# Patient Record
Sex: MALE | Race: WHITE | NOT HISPANIC OR LATINO | Employment: FULL TIME | URBAN - METROPOLITAN AREA
[De-identification: names, ages, dates, MRNs, and addresses within clinical notes are randomized per-mention and may not be internally consistent; named-entity substitution may affect disease eponyms.]

---

## 2019-05-23 ENCOUNTER — HISTORICAL (OUTPATIENT)
Dept: ADMINISTRATIVE | Facility: HOSPITAL | Age: 54
End: 2019-05-23

## 2019-05-23 LAB
ABS NEUT (OLG): 5.2 X10(3)/MCL (ref 2.1–9.2)
ALBUMIN SERPL-MCNC: 4.4 GM/DL (ref 3.4–5)
ALBUMIN/GLOB SERPL: 1.38 {RATIO} (ref 1.5–2.5)
ALP SERPL-CCNC: 71 UNIT/L (ref 38–126)
ALT SERPL-CCNC: 16 UNIT/L (ref 7–52)
APPEARANCE, UA: CLEAR
AST SERPL-CCNC: 17 UNIT/L (ref 15–37)
BACTERIA #/AREA URNS AUTO: ABNORMAL /HPF
BILIRUB SERPL-MCNC: 0.7 MG/DL (ref 0.2–1)
BILIRUB UR QL STRIP: ABNORMAL MG/DL
BILIRUBIN DIRECT+TOT PNL SERPL-MCNC: 0.1 MG/DL (ref 0–0.5)
BILIRUBIN DIRECT+TOT PNL SERPL-MCNC: 0.6 MG/DL
BUN SERPL-MCNC: 22 MG/DL (ref 7–18)
CALCIUM SERPL-MCNC: 9.1 MG/DL (ref 8.5–10)
CHLORIDE SERPL-SCNC: 105 MMOL/L (ref 98–107)
CHOLEST SERPL-MCNC: 216 MG/DL (ref 0–200)
CHOLEST/HDLC SERPL: 5.3 {RATIO}
CO2 SERPL-SCNC: 26 MMOL/L (ref 21–32)
COLOR UR: YELLOW
CREAT SERPL-MCNC: 1.06 MG/DL (ref 0.6–1.3)
ERYTHROCYTE [DISTWIDTH] IN BLOOD BY AUTOMATED COUNT: 13.5 % (ref 11.5–17)
EST. AVERAGE GLUCOSE BLD GHB EST-MCNC: 134 MG/DL
GLOBULIN SER-MCNC: 3.2 GM/DL (ref 1.2–3)
GLUCOSE (UA): NEGATIVE MG/DL
GLUCOSE SERPL-MCNC: 116 MG/DL (ref 74–106)
HBA1C MFR BLD: 6.3 % (ref 4.4–6.4)
HCT VFR BLD AUTO: 46.1 % (ref 42–52)
HDLC SERPL-MCNC: 41 MG/DL (ref 35–60)
HGB BLD-MCNC: 15.8 GM/DL (ref 14–18)
HGB UR QL STRIP: NEGATIVE UNIT/L
KETONES UR QL STRIP: NEGATIVE MG/DL
LDLC SERPL CALC-MCNC: 146 MG/DL (ref 0–129)
LEUKOCYTE ESTERASE UR QL STRIP: NEGATIVE UNIT/L
LYMPHOCYTES # BLD AUTO: 2.9 X10(3)/MCL (ref 0.6–3.4)
LYMPHOCYTES NFR BLD AUTO: 31.8 % (ref 13–40)
MCH RBC QN AUTO: 31.4 PG (ref 27–31.2)
MCHC RBC AUTO-ENTMCNC: 34 GM/DL (ref 32–36)
MCV RBC AUTO: 92 FL (ref 80–94)
MONOCYTES # BLD AUTO: 0.9 X10(3)/MCL (ref 0.1–1.3)
MONOCYTES NFR BLD AUTO: 9.7 % (ref 0.1–24)
NEUTROPHILS NFR BLD AUTO: 58.5 % (ref 47–80)
NITRITE UR QL STRIP.AUTO: NEGATIVE
PH UR STRIP: 5 [PH]
PLATELET # BLD AUTO: 292 X10(3)/MCL (ref 130–400)
PMV BLD AUTO: 9 FL (ref 9.4–12.4)
POTASSIUM SERPL-SCNC: 4.5 MMOL/L (ref 3.5–5.1)
PROT SERPL-MCNC: 7.6 GM/DL (ref 6.4–8.2)
PROT UR QL STRIP: NEGATIVE MG/DL
PSA SERPL-MCNC: 4.62 NG/ML (ref 0–3.5)
RBC # BLD AUTO: 5.03 X10(6)/MCL (ref 4.7–6.1)
RBC #/AREA URNS HPF: ABNORMAL /HPF
SODIUM SERPL-SCNC: 138 MMOL/L (ref 136–145)
SP GR UR STRIP: 1.02
SQUAMOUS EPITHELIAL, UA: ABNORMAL /LPF
TRIGL SERPL-MCNC: 148 MG/DL (ref 30–150)
TSH SERPL-ACNC: 2.09 MIU/ML (ref 0.35–4.94)
UROBILINOGEN UR STRIP-ACNC: 0.2 MG/DL
VLDLC SERPL CALC-MCNC: 29.6 MG/DL
WBC # SPEC AUTO: 9 X10(3)/MCL (ref 4.5–11.5)
WBC #/AREA URNS AUTO: ABNORMAL /[HPF]

## 2019-11-05 ENCOUNTER — HISTORICAL (OUTPATIENT)
Dept: ADMINISTRATIVE | Facility: HOSPITAL | Age: 54
End: 2019-11-05

## 2019-11-05 LAB
ALBUMIN SERPL-MCNC: 4.3 GM/DL (ref 3.4–5)
ALBUMIN/GLOB SERPL: 1.39 {RATIO} (ref 1.5–2.5)
ALP SERPL-CCNC: 69 UNIT/L (ref 38–126)
ALT SERPL-CCNC: 16 UNIT/L (ref 7–52)
AST SERPL-CCNC: 13 UNIT/L (ref 15–37)
BILIRUB SERPL-MCNC: 0.6 MG/DL (ref 0.2–1)
BILIRUBIN DIRECT+TOT PNL SERPL-MCNC: 0.1 MG/DL (ref 0–0.5)
BILIRUBIN DIRECT+TOT PNL SERPL-MCNC: 0.5 MG/DL
BUN SERPL-MCNC: 14 MG/DL (ref 7–18)
CALCIUM SERPL-MCNC: 9.2 MG/DL (ref 8.5–10)
CHLORIDE SERPL-SCNC: 105 MMOL/L (ref 98–107)
CHOLEST SERPL-MCNC: 153 MG/DL (ref 0–200)
CHOLEST/HDLC SERPL: 3.6 {RATIO}
CO2 SERPL-SCNC: 26 MMOL/L (ref 21–32)
CREAT SERPL-MCNC: 1.02 MG/DL (ref 0.6–1.3)
EST. AVERAGE GLUCOSE BLD GHB EST-MCNC: 137 MG/DL
GLOBULIN SER-MCNC: 3.1 GM/DL (ref 1.2–3)
GLUCOSE SERPL-MCNC: 138 MG/DL (ref 74–106)
HBA1C MFR BLD: 6.4 % (ref 4.4–6.4)
HDLC SERPL-MCNC: 42 MG/DL (ref 35–60)
LDLC SERPL CALC-MCNC: 100 MG/DL (ref 0–129)
POTASSIUM SERPL-SCNC: 4.6 MMOL/L (ref 3.5–5.1)
PROT SERPL-MCNC: 7.4 GM/DL (ref 6.4–8.2)
SODIUM SERPL-SCNC: 138 MMOL/L (ref 136–145)
TRIGL SERPL-MCNC: 132 MG/DL (ref 30–150)
VLDLC SERPL CALC-MCNC: 26.4 MG/DL

## 2020-05-26 ENCOUNTER — HISTORICAL (OUTPATIENT)
Dept: ADMINISTRATIVE | Facility: HOSPITAL | Age: 55
End: 2020-05-26

## 2020-05-26 LAB
ABS NEUT (OLG): 4.7 X10(3)/MCL (ref 2.1–9.2)
ALBUMIN SERPL-MCNC: 4.4 GM/DL (ref 3.4–5)
ALBUMIN/GLOB SERPL: 1.69 {RATIO} (ref 1.5–2.5)
ALP SERPL-CCNC: 80 UNIT/L (ref 38–126)
ALT SERPL-CCNC: 18 UNIT/L (ref 7–52)
APPEARANCE, UA: CLEAR
AST SERPL-CCNC: 19 UNIT/L (ref 15–37)
BACTERIA #/AREA URNS AUTO: ABNORMAL /HPF
BILIRUB SERPL-MCNC: 0.6 MG/DL (ref 0.2–1)
BILIRUB UR QL STRIP: NEGATIVE MG/DL
BILIRUBIN DIRECT+TOT PNL SERPL-MCNC: 0.1 MG/DL (ref 0–0.5)
BILIRUBIN DIRECT+TOT PNL SERPL-MCNC: 0.5 MG/DL
BUN SERPL-MCNC: 12 MG/DL (ref 7–18)
CALCIUM SERPL-MCNC: 9 MG/DL (ref 8.5–10)
CHLORIDE SERPL-SCNC: 106 MMOL/L (ref 98–107)
CHOLEST SERPL-MCNC: 137 MG/DL (ref 0–200)
CHOLEST/HDLC SERPL: 4.3 {RATIO}
CO2 SERPL-SCNC: 29 MMOL/L (ref 21–32)
COLOR UR: YELLOW
CREAT SERPL-MCNC: 1 MG/DL (ref 0.6–1.3)
ERYTHROCYTE [DISTWIDTH] IN BLOOD BY AUTOMATED COUNT: 13.1 % (ref 11.5–17)
EST. AVERAGE GLUCOSE BLD GHB EST-MCNC: 148 MG/DL
GLOBULIN SER-MCNC: 2.6 GM/DL (ref 1.2–3)
GLUCOSE (UA): NEGATIVE MG/DL
GLUCOSE SERPL-MCNC: 120 MG/DL (ref 74–106)
HBA1C MFR BLD: 6.8 % (ref 4.4–6.4)
HCT VFR BLD AUTO: 45.1 % (ref 42–52)
HDLC SERPL-MCNC: 32 MG/DL (ref 35–60)
HGB BLD-MCNC: 15.3 GM/DL (ref 14–18)
HGB UR QL STRIP: ABNORMAL UNIT/L
KETONES UR QL STRIP: NEGATIVE MG/DL
LDLC SERPL CALC-MCNC: 80 MG/DL (ref 0–129)
LEUKOCYTE ESTERASE UR QL STRIP: ABNORMAL UNIT/L
LYMPHOCYTES # BLD AUTO: 2.6 X10(3)/MCL (ref 0.6–3.4)
LYMPHOCYTES NFR BLD AUTO: 31.4 % (ref 13–40)
MCH RBC QN AUTO: 30.9 PG (ref 27–31.2)
MCHC RBC AUTO-ENTMCNC: 34 GM/DL (ref 32–36)
MCV RBC AUTO: 91 FL (ref 80–94)
MONOCYTES # BLD AUTO: 0.9 X10(3)/MCL (ref 0.1–1.3)
MONOCYTES NFR BLD AUTO: 11.1 % (ref 0.1–24)
NEUTROPHILS NFR BLD AUTO: 57.5 % (ref 47–80)
NITRITE UR QL STRIP.AUTO: NEGATIVE
PH UR STRIP: 5 [PH]
PLATELET # BLD AUTO: 259 X10(3)/MCL (ref 130–400)
PMV BLD AUTO: 9.2 FL (ref 9.4–12.4)
POTASSIUM SERPL-SCNC: 4.3 MMOL/L (ref 3.5–5.1)
PROT SERPL-MCNC: 7 GM/DL (ref 6.4–8.2)
PROT UR QL STRIP: NEGATIVE MG/DL
PSA SERPL-MCNC: 4.6 NG/ML (ref 0–3.5)
RBC # BLD AUTO: 4.95 X10(6)/MCL (ref 4.7–6.1)
RBC #/AREA URNS HPF: ABNORMAL /HPF
SODIUM SERPL-SCNC: 140 MMOL/L (ref 136–145)
SP GR UR STRIP: >1.03
SQUAMOUS EPITHELIAL, UA: ABNORMAL /LPF
TRIGL SERPL-MCNC: 164 MG/DL (ref 30–150)
TSH SERPL-ACNC: 2.69 MIU/ML (ref 0.35–4.94)
UROBILINOGEN UR STRIP-ACNC: 0.2 MG/DL
VLDLC SERPL CALC-MCNC: 32.8 MG/DL
WBC # SPEC AUTO: 8.2 X10(3)/MCL (ref 4.5–11.5)
WBC #/AREA URNS AUTO: ABNORMAL /[HPF]

## 2020-08-06 ENCOUNTER — HISTORICAL (OUTPATIENT)
Dept: ADMINISTRATIVE | Facility: HOSPITAL | Age: 55
End: 2020-08-06

## 2020-08-06 LAB
ALBUMIN SERPL-MCNC: 4.7 GM/DL (ref 3.4–5)
ALBUMIN/GLOB SERPL: 1.81 {RATIO} (ref 1.5–2.5)
ALP SERPL-CCNC: 75 UNIT/L (ref 38–126)
ALT SERPL-CCNC: 17 UNIT/L (ref 7–52)
AST SERPL-CCNC: 19 UNIT/L (ref 15–37)
BILIRUB SERPL-MCNC: 0.6 MG/DL (ref 0.2–1)
BILIRUBIN DIRECT+TOT PNL SERPL-MCNC: 0.1 MG/DL (ref 0–0.5)
BILIRUBIN DIRECT+TOT PNL SERPL-MCNC: 0.5 MG/DL
BUN SERPL-MCNC: 16 MG/DL (ref 7–18)
CALCIUM SERPL-MCNC: 9.5 MG/DL (ref 8.5–10.1)
CHLORIDE SERPL-SCNC: 104 MMOL/L (ref 98–107)
CO2 SERPL-SCNC: 27 MMOL/L (ref 21–32)
CREAT SERPL-MCNC: 1.05 MG/DL (ref 0.6–1.3)
CREAT UR-MCNC: 200 MG/DL
EST. AVERAGE GLUCOSE BLD GHB EST-MCNC: 134 MG/DL
GLOBULIN SER-MCNC: 2.6 GM/DL (ref 1.2–3)
GLUCOSE SERPL-MCNC: 120 MG/DL (ref 74–106)
HBA1C MFR BLD: 6.3 % (ref 4.4–6.4)
MICROALBUMIN UR-MCNC: 30 MG/L
MICROALBUMIN/CREAT RATIO PNL UR: <30 MG/GM
POTASSIUM SERPL-SCNC: 4.7 MMOL/L (ref 3.5–5.1)
PROT SERPL-MCNC: 7.3 GM/DL (ref 6.4–8.2)
SODIUM SERPL-SCNC: 140 MMOL/L (ref 136–145)

## 2021-02-18 ENCOUNTER — HISTORICAL (OUTPATIENT)
Dept: ADMINISTRATIVE | Facility: HOSPITAL | Age: 56
End: 2021-02-18

## 2021-02-18 LAB
ALBUMIN SERPL-MCNC: 4.6 GM/DL (ref 3.4–5)
ALBUMIN/GLOB SERPL: 1.7 {RATIO} (ref 1.5–2.5)
ALP SERPL-CCNC: 68 UNIT/L (ref 38–126)
ALT SERPL-CCNC: 23 UNIT/L (ref 7–52)
AST SERPL-CCNC: 20 UNIT/L (ref 15–37)
BILIRUB SERPL-MCNC: 0.5 MG/DL (ref 0.2–1)
BILIRUBIN DIRECT+TOT PNL SERPL-MCNC: 0.1 MG/DL (ref 0–0.5)
BILIRUBIN DIRECT+TOT PNL SERPL-MCNC: 0.4 MG/DL
BUN SERPL-MCNC: 16 MG/DL (ref 7–18)
CALCIUM SERPL-MCNC: 9.7 MG/DL (ref 8.5–10.1)
CHLORIDE SERPL-SCNC: 102 MMOL/L (ref 98–107)
CHOLEST SERPL-MCNC: 182 MG/DL (ref 0–200)
CHOLEST/HDLC SERPL: 4.3 {RATIO}
CO2 SERPL-SCNC: 26 MMOL/L (ref 21–32)
CREAT SERPL-MCNC: 1.01 MG/DL (ref 0.6–1.3)
EST. AVERAGE GLUCOSE BLD GHB EST-MCNC: 137 MG/DL
GLOBULIN SER-MCNC: 2.8 GM/DL (ref 1.2–3)
GLUCOSE SERPL-MCNC: 104 MG/DL (ref 74–106)
HBA1C MFR BLD: 6.4 % (ref 4.4–6.4)
HDLC SERPL-MCNC: 42 MG/DL (ref 35–60)
LDLC SERPL CALC-MCNC: 109 MG/DL (ref 0–129)
POTASSIUM SERPL-SCNC: 4.3 MMOL/L (ref 3.5–5.1)
PROT SERPL-MCNC: 7.3 GM/DL (ref 6.4–8.2)
SODIUM SERPL-SCNC: 139 MMOL/L (ref 136–145)
TRIGL SERPL-MCNC: 248 MG/DL (ref 30–150)
VLDLC SERPL CALC-MCNC: 49.6 MG/DL

## 2021-06-01 ENCOUNTER — HISTORICAL (OUTPATIENT)
Dept: ADMINISTRATIVE | Facility: HOSPITAL | Age: 56
End: 2021-06-01

## 2021-06-01 LAB
ABS NEUT (OLG): 5.2 X10(3)/MCL (ref 2.1–9.2)
ALBUMIN SERPL-MCNC: 4.4 GM/DL (ref 3.4–5)
ALBUMIN/GLOB SERPL: 1.76 {RATIO} (ref 1.5–2.5)
ALP SERPL-CCNC: 66 UNIT/L (ref 38–126)
ALT SERPL-CCNC: 17 UNIT/L (ref 7–52)
APPEARANCE, UA: CLEAR
AST SERPL-CCNC: 16 UNIT/L (ref 15–37)
BACTERIA #/AREA URNS AUTO: ABNORMAL /HPF
BILIRUB SERPL-MCNC: 0.5 MG/DL (ref 0.2–1)
BILIRUB UR QL STRIP: NEGATIVE MG/DL
BILIRUBIN DIRECT+TOT PNL SERPL-MCNC: 0.1 MG/DL (ref 0–0.5)
BILIRUBIN DIRECT+TOT PNL SERPL-MCNC: 0.4 MG/DL
BUN SERPL-MCNC: 20 MG/DL (ref 7–18)
CALCIUM SERPL-MCNC: 9.2 MG/DL (ref 8.5–10.1)
CHLORIDE SERPL-SCNC: 106 MMOL/L (ref 98–107)
CHOLEST SERPL-MCNC: 153 MG/DL (ref 0–200)
CHOLEST/HDLC SERPL: 4.2 {RATIO}
CO2 SERPL-SCNC: 25 MMOL/L (ref 21–32)
COLOR UR: ABNORMAL
CREAT SERPL-MCNC: 0.96 MG/DL (ref 0.6–1.3)
ERYTHROCYTE [DISTWIDTH] IN BLOOD BY AUTOMATED COUNT: 13 % (ref 11.5–17)
EST. AVERAGE GLUCOSE BLD GHB EST-MCNC: 143 MG/DL
GLOBULIN SER-MCNC: 2.5 GM/DL (ref 1.2–3)
GLUCOSE (UA): NEGATIVE MG/DL
GLUCOSE SERPL-MCNC: 130 MG/DL (ref 74–106)
HBA1C MFR BLD: 6.6 % (ref 4.4–6.4)
HCT VFR BLD AUTO: 42.9 % (ref 42–52)
HDLC SERPL-MCNC: 36 MG/DL (ref 35–60)
HGB BLD-MCNC: 14.8 GM/DL (ref 14–18)
HGB UR QL STRIP: ABNORMAL UNIT/L
KETONES UR QL STRIP: NEGATIVE MG/DL
LDLC SERPL CALC-MCNC: 85 MG/DL (ref 0–129)
LEUKOCYTE ESTERASE UR QL STRIP: NEGATIVE UNIT/L
LYMPHOCYTES # BLD AUTO: 2.6 X10(3)/MCL (ref 0.6–3.4)
LYMPHOCYTES NFR BLD AUTO: 30.5 % (ref 13–40)
MCH RBC QN AUTO: 31.3 PG (ref 27–31.2)
MCHC RBC AUTO-ENTMCNC: 34 GM/DL (ref 32–36)
MCV RBC AUTO: 91 FL (ref 80–94)
MONOCYTES # BLD AUTO: 0.8 X10(3)/MCL (ref 0.1–1.3)
MONOCYTES NFR BLD AUTO: 9.6 % (ref 0.1–24)
NEUTROPHILS NFR BLD AUTO: 59.9 % (ref 47–80)
NITRITE UR QL STRIP.AUTO: NEGATIVE
PH UR STRIP: 5 [PH]
PLATELET # BLD AUTO: 265 X10(3)/MCL (ref 130–400)
PMV BLD AUTO: 8.6 FL (ref 9.4–12.4)
POTASSIUM SERPL-SCNC: 4 MMOL/L (ref 3.5–5.1)
PROT SERPL-MCNC: 6.9 GM/DL (ref 6.4–8.2)
PROT UR QL STRIP: NEGATIVE MG/DL
PSA SERPL-MCNC: 5.05 NG/ML (ref 0–3.5)
RBC # BLD AUTO: 4.73 X10(6)/MCL (ref 4.7–6.1)
RBC #/AREA URNS HPF: ABNORMAL /HPF
SODIUM SERPL-SCNC: 140 MMOL/L (ref 136–145)
SP GR UR STRIP: >1.03
SQUAMOUS EPITHELIAL, UA: ABNORMAL /LPF
TRIGL SERPL-MCNC: 194 MG/DL (ref 30–150)
TSH SERPL-ACNC: 2.8 MIU/ML (ref 0.35–4.94)
UROBILINOGEN UR STRIP-ACNC: 0.2 MG/DL
VLDLC SERPL CALC-MCNC: 38.8 MG/DL
WBC # SPEC AUTO: 8.6 X10(3)/MCL (ref 4.5–11.5)
WBC #/AREA URNS AUTO: ABNORMAL /[HPF]

## 2021-12-02 ENCOUNTER — HISTORICAL (OUTPATIENT)
Dept: ADMINISTRATIVE | Facility: HOSPITAL | Age: 56
End: 2021-12-02

## 2021-12-02 LAB
ALBUMIN SERPL-MCNC: 4.4 GM/DL (ref 3.4–5)
ALBUMIN/GLOB SERPL: 1.69 {RATIO} (ref 1.5–2.5)
ALP SERPL-CCNC: 71 UNIT/L (ref 38–126)
ALT SERPL-CCNC: 19 UNIT/L (ref 7–52)
AST SERPL-CCNC: 17 UNIT/L (ref 15–37)
BILIRUB SERPL-MCNC: 0.7 MG/DL (ref 0.2–1)
BILIRUBIN DIRECT+TOT PNL SERPL-MCNC: 0.2 MG/DL (ref 0–0.5)
BILIRUBIN DIRECT+TOT PNL SERPL-MCNC: 0.5 MG/DL
BUN SERPL-MCNC: 14 MG/DL (ref 7–18)
CALCIUM SERPL-MCNC: 9.1 MG/DL (ref 8.5–10.1)
CHLORIDE SERPL-SCNC: 104 MMOL/L (ref 98–107)
CHOLEST SERPL-MCNC: 159 MG/DL (ref 0–200)
CHOLEST/HDLC SERPL: 4.5 {RATIO}
CO2 SERPL-SCNC: 26 MMOL/L (ref 21–32)
CREAT SERPL-MCNC: 0.9 MG/DL (ref 0.6–1.3)
EST CREAT CLEARANCE SER (OHS): 187.77 ML/MIN
EST. AVERAGE GLUCOSE BLD GHB EST-MCNC: 140 MG/DL
GLOBULIN SER-MCNC: 2.6 GM/DL (ref 1.2–3)
GLUCOSE SERPL-MCNC: 134 MG/DL (ref 74–106)
HBA1C MFR BLD: 6.5 % (ref 4.4–6.4)
HDLC SERPL-MCNC: 35 MG/DL (ref 35–60)
LDLC SERPL CALC-MCNC: 95 MG/DL (ref 0–129)
POTASSIUM SERPL-SCNC: 4.3 MMOL/L (ref 3.5–5.1)
PROT SERPL-MCNC: 7 GM/DL (ref 6.4–8.2)
SODIUM SERPL-SCNC: 139 MMOL/L (ref 136–145)
TRIGL SERPL-MCNC: 166 MG/DL (ref 30–150)
VLDLC SERPL CALC-MCNC: 33.2 MG/DL

## 2022-04-10 ENCOUNTER — HISTORICAL (OUTPATIENT)
Dept: ADMINISTRATIVE | Facility: HOSPITAL | Age: 57
End: 2022-04-10

## 2022-04-29 VITALS
DIASTOLIC BLOOD PRESSURE: 94 MMHG | HEIGHT: 71 IN | BODY MASS INDEX: 44.1 KG/M2 | WEIGHT: 315 LBS | SYSTOLIC BLOOD PRESSURE: 146 MMHG

## 2022-05-02 NOTE — HISTORICAL [facility]
This is a historical note converted from [employer]. Formatting and pictures may have been removed.  Please reference [employer]ah for original formatting and attached multimedia. Chief Complaint  CPX/FASTING  History of Present Illness  Patient is here today for wellness CPX. ?He was last seen?over 3 years ago.? He did have a prostate biopsy since her last visit that was negative. ?He also had gallbladder surgery?that ended up turning to an open surgery?and has left him with some discomfort in his right upper quadrant along?his large surgical incision.? He has gained about 22 pounds over the past 3 years.  Review of Systems  GENERAL:?no? unexplained wt ?loss or gain, fever, + fatigue, chills, night sweats or ?weakness  HEENT: no?? sore throat, ?ear pain, ?sinus pressure, ?nasal congestion, or ?rhinorrhea, +AR  VISION:?no ?vision changes, ?glaucoma, cataracts, reading glasses  CARDIAC: no? chest pain,??palpitations, +?Dyspnea on exertion (out of shape), ?orthopnea  RESPIRATORY:?no??cough,?wheezing, sputum production or?SOB  GI:????+ RUQ? abdominal pain since GB surgery 4 years ?ago, n&v,?constipation, diarrhea,??blood in stool or_?no family history of colon cancer?_  :?no? dysuria, ?hematuria, ?frequency, urgency, ?incontinence,? testicular pain/swelling,?_?no family history of prostate cancer_? elevated PSa in past neg biopsy  MUSC/SKEL:? no? myalgia, ?weakness, edema,? Fingers/knees?arthralgia, or ?joint effusion, hx gout  SKIN:? No?rash, hives,?itching or?sores  NEURO:? No?headaches, numbness, tingling,? weakness, or ?dizziness  PSYCH:? No anxiety, off and on depression, ?irritability, ?suicidal ideation or hallucinations  ENDO:? No ?polyuria, ?polydipsia, ?polyphagia  HEME:? No Bruising, lymphadenopathy, bleeding disorders ?or?signs of anemia  Physical Exam  Vitals & Measurements  HR:?102(Peripheral)? BP:?148/96?  HT:?181.6?cm? WT:?146.6?kg? BMI:?44.45?  GENERAL: NAD, alert and oriented x 3  SKIN:? no rash or abnormal  appearing skin lesions  HEENT:? PERRLA, EOMI, mouth wnl, throat wnl, EAC and TM wnl bilaterally  NECK:? FROM, no lymphadenopathy, no thyroid abnormalities palpable  CHEST:? CTA bilaterally no wheezes, crackles or rubs  CARDIAC:? RRR, no murmurs audible  ABDOMEN:? Soft, nontender, nondistended, NBSx4,?no rebound or guarding, no HSM, Large RUQ incision, mild herniation.  EXTREMITIES:? no clubbing, cyanosis, or edema.? joints wnl. +2 DP/PT pulse bilaterally  NEURO:? no sensory or motor deficits noted. CN II-XII intact. Gait wnl.?  GENITAL: normal testes, no hernia  RECTAL: no hemorrhoids, or fissures, prostate mildly enlarged, smooth surface  Assessment/Plan  1.?Wellness examination?Z00.00  ?CBC, CMP, FLP,? U/A,TSH, A1C ,? colonoscopy 2013 due 2016--set up Dr Starks,? Encourage pt to increase cardiovascular exercise and attempt to obtain at least 150 minutes of moderate aerobic exercise per week or 75 minutes of vigorous aerobic exercise weekly.  Ordered:  CBC w/ Auto Diff, Routine collect, 05/23/19 13:49:00 CDT, Blood, Order for future visit, Stop date 05/23/19 13:49:00 CDT, Lab Collect, Wellness examination, 05/23/19 13:49:00 CDT  Clinic Follow Up Physical, *Est. 05/23/20 3:00:00 CDT, Order for future visit, Wellness examination, [facility]  Comprehensive Metabolic Panel, Routine collect, 05/23/19 13:49:00 CDT, Blood, Order for future visit, Stop date 05/23/19 13:49:00 CDT, Lab Collect, Wellness examination, 05/23/19 13:49:00 CDT  External Referral, screening colonoscopy, GI, [facility], last in 2013, due in 2016, saw lisa but would like different MD, 05/23/19 13:52:00 CDT, Wellness examination  Hemoglobin A1c, Routine collect, 05/23/19 13:50:00 CDT, Blood, Order for future visit, Stop date 05/23/19 13:50:00 CDT, Lab Collect, Wellness examination, 05/23/19 13:50:00 CDT  Lab Collection Request, 05/23/19 13:49:00 CDT, [facility] - [facility], 05/23/19 13:49:00 CDT  Lipid Panel, Routine collect, 05/23/19 13:50:00 CDT,  Blood, Order for future visit, Stop date 05/23/19 13:50:00 CDT, Lab Collect, Wellness examination, 05/23/19 13:50:00 CDT  Preventative Health Care New 40-64 years [id number] PC, Wellness examination, [facility] - [facility], 05/23/19 13:51:00 CDT  Prostate Specific Antigen, Routine collect, 05/23/19 13:50:00 CDT, Blood, Order for future visit, Stop date 05/23/19 13:50:00 CDT, Lab Collect, Wellness examination, 05/23/19 13:50:00 CDT  Thyroid Stimulating Hormone, Routine collect, 05/23/19 13:50:00 CDT, Blood, Order for future visit, Stop date 05/23/19 13:50:00 CDT, Lab Collect, Wellness examination, 05/23/19 13:50:00 CDT  Urinalysis Complete no reflex, Routine collect, Urine, Order for future visit, 05/23/19 13:50:00 CDT, Stop date 05/23/19 13:50:00 CDT, Nurse collect, Wellness examination  ?  2.?Immunization due?Z23  ?Tdap  Ordered:  tetanus/diphth/pertuss (Tdap) adult/adol, 0.5 mL, IM, Once-Unscheduled, first dose 05/23/19 13:50:00 CDT  ?  3.?Elevated BP without diagnosis of hypertension?R03.0  ?monitor Bp and drop of for review in 2 weeks.  ?  Referrals  External Referral, screening colonoscopy, GI, [facility], last in 2013, due in 2016, saw lisa but would like different MD, 05/23/19 13:52:00 CDT, Wellness examination  Clinic Follow Up Physical, *Est. 05/23/20 3:00:00 CDT, Order for future visit, Wellness examination, [facility]   Problem List/Past Medical History  Ongoing  Elevated BP without diagnosis of hypertension  Morbid obesity  Wellness examination  Historical  Articular gout  Depression  Elevated PSA  H. pylori  kidney stones  Pancreatitis  pnuemonia  Procedure/Surgical History  Cholangiography and/or pancreatography; through existing catheter, radiological supervision and interpretation. (03/10/2014)  Injection procedure for cholangiography through an existing catheter (eg, percutaneous transhepatic or T-tube). (03/10/2014)  Other cholangiogram (03/10/2014)  Cholecystectomy (02/12/2014)  Cholecystectomy  Laparoscopic (None) (02/12/2014)  Cholecystectomy; with cholangiography. (02/12/2014)  Intraoperative cholangiogram (02/12/2014)  Colonoscopy (09/01/2013)  Renal lithotripsy (2010)  Ankle  cholecystec  Cholecystectomy; with cholangiography  prostate Biopsy  tib/fib fracture and surgery   Medications  loratadine 10 mg oral capsule, 10 mg= 1 cap(s), Oral, Daily  Allergies  Seafood  Social History  Alcohol  Past, Beer, Stopped age 29 Years. 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  Congestive heart disease.: Father.  Diabetes mellitus: Father.  Immunizations  Vaccine Date Status Comments   tetanus/diphtheria/pertussis, acel(Tdap) 05/23/2019 Given    influenza virus vaccine, inactivated 11/18/2017 Recorded    influenza virus vaccine, inactivated 11/10/2016 Recorded    influenza virus vaccine, inactivated 11/13/2013 Recorded    influenza virus vaccine, inactivated 10/04/2011 Recorded 2019-05-22: UNKNOWN CAMPAIGNID   tetanus/diphth/pertuss (Tdap) adult/adol 10/21/2010 Recorded    influenza virus vaccine, inactivated 10/25/2007 Recorded    Health Maintenance  Health Maintenance  ???Pending?(in the next year)  ??? ??OverDue  ??? ? ? ?ADL Screening due??02/14/15??and every 1??year(s)  ??? ? ? ?Alcohol Misuse Screening due??01/01/19??and every 1??year(s)  ??? ??Due?  ??? ? ? ?Aspirin Therapy for CVD Prevention due??05/23/19??and every 1??year(s)  ??? ??Due In Future?  ??? ? ? ?Obesity Screening not due until??01/01/20??and every 1??year(s)  ???Satisfied?(in the past 1 year)  ??? ??Satisfied?  ??? ? ? ?Blood  Pressure Screening on??05/23/19.??Satisfied by So Ch CMA  ??? ? ? ?Body Mass Index Check on??05/23/19.??Satisfied by So Ch CMA  ??? ? ? ?Obesity Screening on??05/23/19.??Satisfied by So Ch CMA  ??? ? ? ?Tetanus Vaccine on??05/23/19.??Satisfied by So Ch CMA  ?  ?

## 2022-05-02 NOTE — HISTORICAL [facility]
This is a historical note converted from [employer]. Formatting and pictures may have been removed.  Please reference [employer] for original formatting and attached multimedia. Chief Complaint  6MTH RC/FASTING  History of Present Illness  Patient presents to office for 6-month follow-up diabetes.? He is taking all medications as prescribed. ?He admits to not following a healthy diet. ?He is eating?carbs and fast food.? He denies checking his CBGs at home. ?He is active at work but denies exercising. ?He also denies?recent eye exam. ?He has no complaints at this time. ?He denies chest pain or shortness of breath.  Review of Systems  General: No weight gain  HEENT: No vision changes  Cardiac: No CP, no SOB  GI: No diarrhea, no N/V  Endocrine: No polyuria, no polydipsia, no polyphagia  Extremities: No signs/symptoms of neuropathy  Physical Exam  Vitals & Measurements  T:?36.9? ?C (Oral)? HR:?92(Peripheral)? BP:?130/90?  HT:?181.00?cm? WT:?146.000?kg? BMI:?44.57?  General: Awake, alert, oriented x4  HEENT: PERRLA, mouth and throat clear, moist mucus membranes  Chest: CTA bilaterally  Cardiac: RRR, no murmurs, rubs or gallops  Extremities: No edema, +2 pulses DP/PT  ?  Assessment/Plan  1.?Diabetes mellitus?E11.9  On metformin 500mg BID. Continue current treatment, encouraged?daily CBGs with log, low carb diet/ADA diet recommendations discussed.? Will continue to monitor and FU.?  Eye exam--never, unable to afford.? Encouraged patient to complete when finances become available.  Foot exam 8/2020.  Microalbumin- 8/2020.  CMP/A1C today.  Discussed weight loss with patient.? He needs to increase his physical activity.  Ordered:  Comprehensive Metabolic Panel, Routine collect, 02/18/21 15:14:00 CST, Blood, Stop date 02/18/21 15:14:00 CST, Lab Collect, Diabetes mellitus  Hypercholesterolemia, 02/18/21 15:14:00 CST  Lipid Panel, Routine collect, 02/18/21 15:14:00 CST, Blood, Stop date 02/18/21 15:14:00 CST, Lab Collect,  Hypercholesterolemia  Diabetes mellitus, 02/18/21 15:14:00 CST  Office/Outpatient Visit Level 3 Established [id number] PC, Diabetes mellitus  Hypercholesterolemia, [facility] - [facility], 02/18/21 15:11:00 CST  ?  2.?Hypercholesterolemia?E78.00  Continue current medicine. ?Follow low-cholesterol diet with?physical activity.??  Ordered:  Comprehensive Metabolic Panel, Routine collect, 02/18/21 15:14:00 CST, Blood, Stop date 02/18/21 15:14:00 CST, Lab Collect, Diabetes mellitus  Hypercholesterolemia, 02/18/21 15:14:00 CST  Lipid Panel, Routine collect, 02/18/21 15:14:00 CST, Blood, Stop date 02/18/21 15:14:00 CST, Lab Collect, Hypercholesterolemia  Diabetes mellitus, 02/18/21 15:14:00 CST  Office/Outpatient Visit Level 3 Established [id number] PC, Diabetes mellitus  Hypercholesterolemia, [facility] - [facility], 02/18/21 15:11:00 CST  ?  Orders:  metFORMIN, 500 mg = 1 tab(s), Oral, BID, # 60 tab(s), 6 Refill(s), Pharmacy: [facility] #[id number], 181, cm, Height/Length Dosing, 02/18/21 14:43:00 CST, 146, kg, Weight Dosing, 02/18/21 14:43:00 CST  Clinic Follow up, *Est. 06/01/21 3:00:00 CDT, Order for future visit, Wellness examination, [facility]  /90--given log to record for 2 weeks.? Fax number written on?log.? Decrease salt intake.? Weight loss?discussed.  Referrals  Clinic Follow up, *Est. 06/01/21 3:00:00 CDT, Order for future visit, Wellness examination, [facility]  Clinic Follow up, Order for future visit   Problem List/Past Medical History  Ongoing  Diabetes mellitus  Elevated BP without diagnosis of hypertension  Hypercholesterolemia  Morbid obesity  Wellness examination  Historical  Articular gout  Depression  Elevated PSA  H. pylori  kidney stones  Pancreatitis  pnuemonia  Procedure/Surgical History  Cholangiography and/or pancreatography; through existing catheter, radiological supervision and interpretation. (03/10/2014)  Injection procedure for cholangiography through an existing catheter (eg, percutaneous  transhepatic or T-tube). (03/10/2014)  Other cholangiogram (03/10/2014)  Cholecystectomy (02/12/2014)  Cholecystectomy Laparoscopic (None) (02/12/2014)  Cholecystectomy; with cholangiography. (02/12/2014)  Intraoperative cholangiogram (02/12/2014)  Colonoscopy (09/01/2013)  Renal lithotripsy (2010)  cholecystec  Cholecystectomy; with cholangiography  prostate Biopsy  tib/fib fracture and surgery   Medications  loratadine 10 mg oral capsule, 10 mg= 1 cap(s), Oral, Daily  metformin 500 mg oral tablet, 500 mg= 1 tab(s), Oral, BID, 6 refills  rosuvastatin 10 mg oral tablet, See Instructions  Wellbutrin  mg/24 hours oral tablet, extended release, 150 mg= 1 tab(s), Oral, q24hr, 3 refills  Allergies  Seafood  Social History  Abuse/Neglect  No, 02/18/2021  No, 08/06/2020  No, 05/26/2020  No, 11/05/2019  Alcohol  Past, Beer, Stopped age 29 Years. 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  Congestive heart disease.: Father.  Diabetes mellitus: Father.  Immunizations  Vaccine Date Status Comments   zoster vaccine, inactivated 10/04/2019 Recorded    influenza virus vaccine, inactivated 10/02/2019 Recorded    tetanus/diphtheria/pertussis, acel(Tdap) 05/23/2019 Given    influenza virus vaccine, inactivated 11/18/2017 Recorded    influenza virus vaccine, inactivated 11/10/2016 Recorded    influenza virus  vaccine, inactivated 11/13/2013 Recorded    influenza virus vaccine, inactivated 10/04/2011 Recorded 2019-05-22: UNKNOWN CAMPAIGNID   tetanus/diphth/pertuss (Tdap) adult/adol 10/21/2010 Recorded    influenza virus vaccine, inactivated 10/25/2007 Recorded    Health Maintenance  Health Maintenance  ???Pending?(in the next year)  ??? ??OverDue  ??? ? ? ?ADL Screening due??02/14/15??and every 1??year(s)  ??? ? ? ?Influenza Vaccine due??10/01/20??and every 1??day(s)  ??? ??Due?  ??? ? ? ?Alcohol Misuse Screening due??01/02/21??and every 1??year(s)  ??? ? ? ?Diabetes Maintenance-Eye Exam due??02/18/21??Unknown Frequency  ??? ? ? ?Zoster Vaccine due??02/18/21??Unknown Frequency  ??? ??Due In Future?  ??? ? ? ?Diabetes Maintenance-Fasting Lipid Profile not due until??05/26/21??and every 1??year(s)  ??? ? ? ?Aspirin Therapy for CVD Prevention not due until??05/26/21??and every 1??year(s)  ??? ? ? ?Diabetes Maintenance-Foot Exam not due until??08/06/21??and every 1??year(s)  ??? ? ? ?Diabetes Maintenance-Serum Creatinine not due until??08/06/21??and every 1??year(s)  ??? ? ? ?Obesity Screening not due until??01/01/22??and every 1??year(s)  ???Satisfied?(in the past 1 year)  ??? ??Satisfied?  ??? ? ? ?Aspirin Therapy for CVD Prevention on??05/26/20.??Satisfied by Zachary Gonzales MD  ??? ? ? ?Blood Pressure Screening on??02/18/21.??Satisfied by Lauren Collado NP  ??? ? ? ?Body Mass Index Check on??02/18/21.??Satisfied by So Ch CMA  ??? ? ? ?Diabetes Maintenance-Fasting Lipid Profile on??05/26/20.??Satisfied by Steph Ch  ??? ? ? ?Diabetes Maintenance-Foot Exam on??08/06/20.??Satisfied by Lauren Collado NP  ??? ? ? ?Diabetes Maintenance-HgbA1c on??02/18/21.??Satisfied by Santi Ritter  ??? ? ? ?Diabetes Maintenance-Microalbumin on??08/06/20.??Satisfied by Santi Ritter  ??? ? ? ?Diabetes Maintenance-Serum Creatinine on??08/06/20.??Satisfied by Steph Ch  ??? ? ? ?Diabetes  Screening on??02/18/21.??Satisfied by Santi Ritter  ??? ? ? ?Lipid Screening on??05/26/20.??Satisfied by Steph Ch  ??? ? ? ?Obesity Screening on??02/18/21.??Satisfied by So Ch CMA  ?      agree with fany huddleston and followup.

## 2022-05-02 NOTE — HISTORICAL [facility] CERNER
This is a historical note converted from Jonah. Formatting and pictures may have been removed.  Please reference Jonah for original formatting and attached multimedia. Chief Complaint  6MTH RC/FASTING. EVAL IRRITATED SKIN TAGS ON BACK.  History of Present Illness  Patient is here today for 6-month recheck diabetes.? He reports that he started lisinopril?at last visit?and it was causing a drop in his blood pressure. ?He decreased?to 1/2 tablet?and it continues to happen.? His blood pressure is elevated today.? We will try losartan 25 mg daily?and he will keep a blood pressure record and email it for review in 2 to 3 weeks.? He does have a?cherry?hemangioma on his back?and keeps?getting caught on?straps of his?book sac?and he would like to have it removed.? He did get his Covid vaccination and is planning on getting the booster soon.? He has not done a diabetic eye exam and I offered to set up an appointment for him?however he declined?and will try to set it up on his own.  Review of Systems  General:???no?weightgain?_  HEENT:???novision changes,? dm eye exam?greater than 1 year ago? need to set up--declined for us to set up?  CARDIAC:?no?chest pain, SOB,  GI:?no?diarrhea,?no?n&v  ENDOCRINE:?nopolyuria,no?polydipsia,?no?polyphagia  EXT:?no?signs of neuropathy  Physical Exam  Vitals & Measurements  HR:?92(Peripheral)? BP:?146/94?  HT:?181.00?cm? WT:?146.600?kg? BMI:?44.75?  GENERAL:?? alert and oriented x4  HEENT:? PERRLA, mouth and throat clear, mucous membranes moist  Skin:?Elevated?purple?3/4?centimeter?cherry hemangioma center of back  CHEST:? CTA bilaterally  CARDIAC:? RRR no murmurs audible  EXT:?no? ?edema to lower extremities? ?bilaterally,?diabetic foot exam today, see form  Assessment/Plan  1.?Diabetes mellitus?E11.9  ?Last A1C 6.6, continue Metformin 500 bid, started losartan today, DM eye exam --set up. DM foot exam today  Ordered:  Comprehensive Metabolic Panel, Routine collect, 12/02/21 9:44:00 CST,  Blood, Order for future visit, Stop date 12/02/21 9:44:00 CST, Lab Collect, Diabetes mellitus  HTN (hypertension)  Hypercholesterolemia, 12/02/21 9:44:00 CST  Hemoglobin A1c, Routine collect, 12/02/21 9:44:00 CST, Blood, Order for future visit, Stop date 12/02/21 9:44:00 CST, Lab Collect, Diabetes mellitus, 12/02/21 9:44:00 CST  Lab Collection Request, 12/02/21 9:44:00 CST, [facility] AMB - AFP, 12/02/21 9:44:00 CST, Diabetes mellitus  HTN (hypertension)  Hypercholesterolemia  Office/Outpatient Visit Level 3 Established 99291 PC, Diabetes mellitus  HTN (hypertension)  Hypercholesterolemia, [facility] AMB - AFP, 12/02/21 9:45:00 CST  ?  2.?HTN (hypertension)?I10  (lisinopril 5 mg caused low BP when outdoors working) begin losartan 25 --fax BP rcord in 2-3 weeks  Ordered:  Comprehensive Metabolic Panel, Routine collect, 12/02/21 9:44:00 CST, Blood, Order for future visit, Stop date 12/02/21 9:44:00 CST, Lab Collect, Diabetes mellitus  HTN (hypertension)  Hypercholesterolemia, 12/02/21 9:44:00 CST  Lab Collection Request, 12/02/21 9:44:00 CST, [facility] AMB - AFP, 12/02/21 9:44:00 CST, Diabetes mellitus  HTN (hypertension)  Hypercholesterolemia  Office/Outpatient Visit Level 3 Established 38860 PC, Diabetes mellitus  HTN (hypertension)  Hypercholesterolemia, [facility] AMB - AFP, 12/02/21 9:45:00 CST  ?  3.?Hypercholesterolemia?E78.00  ?continue crestor 10 mg  Ordered:  Comprehensive Metabolic Panel, Routine collect, 12/02/21 9:44:00 CST, Blood, Order for future visit, Stop date 12/02/21 9:44:00 CST, Lab Collect, Diabetes mellitus  HTN (hypertension)  Hypercholesterolemia, 12/02/21 9:44:00 CST  Lab Collection Request, 12/02/21 9:44:00 CST, [facility]LINK AMB - AFP, 12/02/21 9:44:00 CST, Diabetes mellitus  HTN (hypertension)  Hypercholesterolemia  Lipid Panel, Routine collect, 12/02/21 9:44:00 CST, Blood, Order for future visit, Stop date 12/02/21 9:44:00 CST, Lab Collect, Hypercholesterolemia, 12/02/21 9:44:00  CST  Office/Outpatient Visit Level 3 Established 83069 PC, Diabetes mellitus  HTN (hypertension)  Hypercholesterolemia, [facility] AMB - AFP, 12/02/21 9:45:00 CST  ?  4.?Encounter for immunization?Z23  ?flu blok  ?  Orders:  losartan, 25 mg = 1 tab(s), Oral, Daily, # 30 tab(s), 6 Refill(s), Pharmacy: [id number], 181, cm, Height/Length Dosing, 12/02/21 9:31:00 CST, 146.6, kg, Weight Dosing, 12/02/21 9:31:00 CST  Clinic Follow up, *Est. 06/02/22 3:00:00 CDT, Order for future visit, Wellness examination, HLink AFP  CMP, FLP, A1C---? colon scheduled for 1/18/2022  Referrals  Clinic Follow up, *Est. 06/02/22 3:00:00 CDT, Order for future visit, Wellness examination, HLink AFP   Problem List/Past Medical History  Ongoing  Diabetes mellitus  Elevated BP without diagnosis of hypertension  Hypercholesterolemia  Morbid obesity  Wellness examination  Historical  Articular gout  Depression  Elevated PSA  H. pylori  kidney stones  Pancreatitis  pnuemonia  Procedure/Surgical History  Cholangiography and/or pancreatography; through existing catheter, radiological supervision and interpretation. (03/10/2014)  Injection procedure for cholangiography through an existing catheter (eg, percutaneous transhepatic or T-tube). (03/10/2014)  Other cholangiogram (03/10/2014)  Cholecystectomy (02/12/2014)  Cholecystectomy Laparoscopic (None) (02/12/2014)  Cholecystectomy; with cholangiography. (02/12/2014)  Intraoperative cholangiogram (02/12/2014)  Colonoscopy (09/01/2013)  Renal lithotripsy (2010)  cholecystec  Cholecystectomy; with cholangiography  prostate Biopsy  tib/fib fracture and surgery   Medications  losartan 25 mg oral tablet, 25 mg= 1 tab(s), Oral, Daily, 6 refills  metformin 500 mg oral tablet, See Instructions  rosuvastatin 10 mg oral tablet, See Instructions, 9 refills  Allergies  Seafood  Social History  Abuse/Neglect  No, 12/02/2021  No, 06/01/2021  No, 02/18/2021  No, 08/06/2020  No, 05/26/2020  No,  11/05/2019  Alcohol  Past, Beer, Stopped age 29 Years. 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  Home/Environment  Lives with Alone., 05/22/2019  Lives with ., 05/22/2019  Substance Use - Denies Substance Abuse, 02/12/2014  Tobacco - Denies Tobacco Use, 02/12/2014  Never (less than 100 in lifetime), No, 12/02/2021  Never (less than 100 in lifetime), No, 06/01/2021  Never (less than 100 in lifetime), No, 06/01/2021  Never (less than 100 in lifetime), N/A, 02/18/2021  Never (less than 100 in lifetime), N/A, 08/06/2020  Never (less than 100 in lifetime), No, 05/26/2020  Never (less than 100 in lifetime), N/A, 11/05/2019  Never (less than 100 in lifetime), N/A, 05/23/2019  Never (less than 100 in lifetime), N/A, 05/22/2019  Family History  Acute myocardial infarction.: Father.  Congestive heart disease.: Father.  Diabetes mellitus: Father.  Immunizations  Vaccine Date Status Comments   influenza virus vaccine, inactivated 12/02/2021 Given    pneumococcal 13-valent conjugate vaccine 06/01/2021 Given    COVID-19 mRNA, LNP-S, PF - Moderna 04/16/2021 Recorded    COVID-19 mRNA, LNP-S, PF - Moderna 03/17/2021 Recorded    influenza virus vaccine, inactivated 09/30/2020 Recorded    zoster vaccine, inactivated 12/13/2019 Recorded    zoster vaccine, inactivated 10/04/2019 Recorded    zoster vaccine, inactivated 10/02/2019 Recorded    influenza virus vaccine, inactivated 10/02/2019 Recorded    tetanus/diphtheria/pertussis, acel(Tdap) 05/23/2019 Given    influenza virus vaccine, inactivated 11/18/2017 Recorded    influenza virus vaccine, inactivated 11/10/2016 Recorded    influenza virus vaccine, inactivated 11/13/2013 Recorded    influenza virus vaccine, inactivated 10/04/2011 Recorded 2019-05-22: UNKNOWN CAMPAIGNID   tetanus/diphth/pertuss (Tdap) adult/adol 10/21/2010 Recorded    influenza virus vaccine, inactivated 10/25/2007 Recorded     Health Maintenance  Health Maintenance  ???Pending?(in the next year)  ??? ??OverDue  ??? ? ? ?ADL Screening due??02/14/15??and every 1??year(s)  ??? ? ? ?Alcohol Misuse Screening due??01/02/21??and every 1??year(s)  ??? ? ? ?Aspirin Therapy for CVD Prevention due??05/26/21??and every 1??year(s)  ??? ? ? ?Diabetes Maintenance-Foot Exam due??08/06/21??and every 1??year(s)  ??? ??Due?  ??? ? ? ?Depression Screening due??12/02/21??Unknown Frequency  ??? ? ? ?Diabetes Maintenance-Eye Exam due??12/02/21??Unknown Frequency  ??? ??Due In Future?  ??? ? ? ?Obesity Screening not due until??01/01/22??and every 1??year(s)  ??? ? ? ?Hypertension Management-BMP not due until??02/18/22??and every 1??year(s)  ??? ? ? ?Diabetes Maintenance-HgbA1c not due until??06/01/22??and every 1??year(s)  ??? ? ? ?Diabetes Maintenance-Fasting Lipid Profile not due until??06/01/22??and every 1??year(s)  ??? ? ? ?Diabetes Maintenance-Serum Creatinine not due until??06/01/22??and every 1??year(s)  ???Satisfied?(in the past 1 year)  ??? ??Satisfied?  ??? ? ? ?Blood Pressure Screening on??12/02/21.??Satisfied by So Ch CMA  ??? ? ? ?Body Mass Index Check on??12/02/21.??Satisfied by So Ch CMA  ??? ? ? ?Diabetes Maintenance-Fasting Lipid Profile on??06/01/21.??Satisfied by Santi Ritter  ??? ? ? ?Diabetes Maintenance-HgbA1c on??06/01/21.??Satisfied by Steph Ch  ??? ? ? ?Diabetes Maintenance-Serum Creatinine on??06/01/21.??Satisfied by Santi Ritter  ??? ? ? ?Diabetes Screening on??06/01/21.??Satisfied by Steph Ch  ??? ? ? ?Hypertension Management-Blood Pressure on??12/02/21.??Satisfied by So Ch CMA  ??? ? ? ?Hypertension Management-BMP on??06/01/21.??Satisfied by Santi Ritter  ??? ? ? ?Influenza Vaccine on??12/02/21.??Satisfied by Zachary Gonzales MD  ??? ? ? ?Lipid Screening on??06/01/21.??Satisfied by Santi Ritter  ??? ? ? ?Obesity Screening on??12/02/21.??Satisfied by Jing STANTON,  So STEPHENSON  ?

## 2022-05-02 NOTE — HISTORICAL [facility] CERNER
This is a historical note converted from Jonah. Formatting and pictures may have been removed.  Please reference Jonah for original formatting and attached multimedia. Chief Complaint  6MTH RC/FASTING  History of Present Illness  Pt is here today for 6 month f/u hyperglycemia and high chol.? At last visit, he had not been seen in 3 years and labs revealed A1C of 6.3.? His chol was elevated and as a result, he was started on crestor 10 mg.? He reports that he is only taking the Crestor?about 50% of the time because he forgets.? He has not lost any weight?since her last visit.  Review of Systems  General:???no?weightgain?_  HEENT:???novision changes,?  CARDIAC:?no?chest pain, SOB,  GI:?no?diarrhea,?no?n&v  ENDOCRINE:?nopolyuria,no?polydipsia,?no?polyphagia  EXT:?no?signs of neuropathy  Physical Exam  Vitals & Measurements  HR:?88(Peripheral)? BP:?130/84?  HT:?181?cm? WT:?146.2?kg? BMI:?44.63?  GENERAL:?? alert and oriented x4  HEENT:? PERRLA, mouth and throat clear, mucous membranes moist  CHEST:? CTA bilaterally  CARDIAC:? RRR no murmurs audible  EXT:?no? ?edema to lower extremities? ?bilaterally,?  Assessment/Plan  1.?Hyperglycemia?R73.9  Last A1C 6.3, ?low carb diet, check A1C today.  Ordered:  Clinic Follow up, *Est. 05/05/20 3:00:00 CDT, Order for future visit, Wellness examination  Hyperglycemia  Hypercholesterolemia, [facility]Link AFP  Comprehensive Metabolic Panel, Routine collect, 11/05/19 10:47:00 CST, Blood, Order for future visit, Stop date 11/05/19 10:47:00 CST, Lab Collect, Hyperglycemia  Hypercholesterolemia, 11/05/19 10:47:00 CST  Hemoglobin A1c, Routine collect, 11/05/19 10:47:00 CST, Blood, Order for future visit, Stop date 11/05/19 10:47:00 CST, Lab Collect, Hyperglycemia  Hypercholesterolemia, 11/05/19 10:47:00 CST  Lipid Panel, Routine collect, 11/05/19 10:47:00 CST, Blood, Order for future visit, Stop date 11/05/19 10:47:00 CST, Lab Collect, Hyperglycemia  Hypercholesterolemia, 11/05/19 10:47:00  CST  Office/Outpatient Visit Level 3 Established 91917 PC, Hyperglycemia  Hypercholesterolemia, [facility]LINK AMB - AFP, 11/05/19 10:48:00 CST  ?  2.?Hypercholesterolemia?E78.00  ?continue crestor 10 mg--takes 50% time  Ordered:  Clinic Follow up, *Est. 05/05/20 3:00:00 CDT, Order for future visit, Wellness examination  Hyperglycemia  Hypercholesterolemia, HLink AFP  Comprehensive Metabolic Panel, Routine collect, 11/05/19 10:47:00 CST, Blood, Order for future visit, Stop date 11/05/19 10:47:00 CST, Lab Collect, Hyperglycemia  Hypercholesterolemia, 11/05/19 10:47:00 CST  Hemoglobin A1c, Routine collect, 11/05/19 10:47:00 CST, Blood, Order for future visit, Stop date 11/05/19 10:47:00 CST, Lab Collect, Hyperglycemia  Hypercholesterolemia, 11/05/19 10:47:00 CST  Lipid Panel, Routine collect, 11/05/19 10:47:00 CST, Blood, Order for future visit, Stop date 11/05/19 10:47:00 CST, Lab Collect, Hyperglycemia  Hypercholesterolemia, 11/05/19 10:47:00 CST  Office/Outpatient Visit Level 3 Established 11586 PC, Hyperglycemia  Hypercholesterolemia, [facility]LINK AMB - AFP, 11/05/19 10:48:00 CST  ?  3.?Immunization due?Z23  ?flu shot  ?  Orders:  Lab Collection Request, 11/05/19 10:47:00 CST, HLINK AMB - AFP, 11/05/19 10:47:00 CST  CMP, FLP, A1C today--[facility]ysabel lopez unable to reach him to set up colonoscopy that was due in 2016--[facility] gstr # given, pt will contact.  Referrals  Clinic Follow up, *Est. 05/05/20 3:00:00 CDT, Order for future visit, Wellness examination  Hyperglycemia  Hypercholesterolemia, HLink AFP   Problem List/Past Medical History  Ongoing  Elevated BP without diagnosis of hypertension  Morbid obesity  Wellness examination  Historical  Articular gout  Depression  Elevated PSA  H. pylori  kidney stones  Pancreatitis  pnuemonia  Procedure/Surgical History  Cholangiography and/or pancreatography; through existing catheter, radiological supervision and interpretation. (03/10/2014)  Injection procedure for  cholangiography through an existing catheter (eg, percutaneous transhepatic or T-tube). (03/10/2014)  Other cholangiogram (03/10/2014)  Cholecystectomy (02/12/2014)  Cholecystectomy Laparoscopic (None) (02/12/2014)  Cholecystectomy; with cholangiography. (02/12/2014)  Intraoperative cholangiogram (02/12/2014)  Colonoscopy (09/01/2013)  Renal lithotripsy (2010)  Ankle  cholecystec  Cholecystectomy; with cholangiography  prostate Biopsy  tib/fib fracture and surgery   Medications  Crestor 10 mg oral tablet, 10 mg= 1 tab(s), Oral, Daily, 6 refills  loratadine 10 mg oral capsule, 10 mg= 1 cap(s), Oral, Daily  Allergies  Seafood  Social History  Abuse/Neglect  No, 11/05/2019  Alcohol  Past, Beer, Stopped age 29 Years. 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  Congestive heart disease.: Father.  Diabetes mellitus: Father.  Immunizations  Vaccine Date Status Comments   zoster vaccine, inactivated 10/04/2019 Recorded    influenza virus vaccine, inactivated 10/02/2019 Recorded    tetanus/diphtheria/pertussis, acel(Tdap) 05/23/2019 Given    influenza virus vaccine, inactivated 11/18/2017 Recorded    influenza virus vaccine, inactivated 11/10/2016 Recorded    influenza virus vaccine, inactivated 11/13/2013 Recorded    influenza virus vaccine, inactivated 10/04/2011 Recorded 2019-05-22: UNKNOWN CAMPAIGNID   tetanus/diphth/pertuss (Tdap) adult/adol 10/21/2010 Recorded    influenza virus vaccine, inactivated 10/25/2007 Recorded    Health Maintenance  Health  Maintenance  ???Pending?(in the next year)  ??? ??OverDue  ??? ? ? ?Diabetes Screening due??and every?  ??? ? ? ?Influenza Vaccine due??and every?  ??? ? ? ?ADL Screening due??02/14/15??and every 1??year(s)  ??? ? ? ?Alcohol Misuse Screening due??01/01/19??and every 1??year(s)  ??? ??Due?  ??? ? ? ?Aspirin Therapy for CVD Prevention due??11/05/19??and every 1??year(s)  ??? ? ? ?Depression Screening due??11/05/19??and every?  ??? ??Due In Future?  ??? ? ? ?Obesity Screening not due until??01/01/20??and every 1??year(s)  ??? ? ? ?Blood Pressure Screening not due until??11/04/20??and every 1??year(s)  ??? ? ? ?Body Mass Index Check not due until??11/04/20??and every 1??year(s)  ???Satisfied?(in the past 1 year)  ??? ??Satisfied?  ??? ? ? ?Blood Pressure Screening on??11/05/19.??Satisfied by Jing STANTON So L.  ??? ? ? ?Body Mass Index Check on??11/05/19.??Satisfied by Jing STANTON, So L.  ??? ? ? ?Diabetes Screening on??05/23/19.??Satisfied by Santi Ritter  ??? ? ? ?Influenza Vaccine on??10/02/19.??Satisfied by Jing STANTON, So L.  ??? ? ? ?Lipid Screening on??05/23/19.??Satisfied by Santi Ritter  ??? ? ? ?Obesity Screening on??11/05/19.??Satisfied by Jing STANTON, So L.  ??? ? ? ?Tetanus Vaccine on??05/23/19.??Satisfied by Jing STANTON So L.  ?

## 2022-05-02 NOTE — HISTORICAL [facility]
This is a historical note converted from Jonah. Formatting and pictures may have been removed.  Please reference Jonah for original formatting and attached multimedia. Chief Complaint  CPX/FASTING  History of Present Illness  Patient is here today for wellness CPX?and 6-month recheck hyperglycemia.? His last A1c was 6.4.? He has not lost any weight since his last visit.? He is tolerating Crestor 10 mg without side effects.? He does report that the Wellbutrin seems to be helping his depression and would like to continue it.? He is getting ?in 2 weeks.? He does not want referral?for his screening colonoscopy that was due in 2016?due to financial issues. ?He is aware of the risk of waiting.  Review of Systems  GENERAL:?no? unexplained wt ?gain?2lbs , no fever, + fatigue, chills, night sweats or ?weakness  HEENT: no?? sore throat, ?ear pain, ?sinus pressure, ?nasal congestion, or ?rhinorrhea + snoring, has CPAP doesnt wear much  VISION:?no ?vision changes, ?glaucoma, cataracts, reading glasses  CARDIAC: no? chest pain,??palpitations,?Dyspnea on exertion, ?orthopnea  RESPIRATORY:?no??cough,?wheezing, sputum production or?SOB  GI:???+ ?abdominal pain x 5 years since GB surgery ,no ?n&v,?constipation, diarrhea,??blood in stool or_no ?family history of colon cancer?_  :?no? dysuria, ?hematuria, +?frequency x years , no urgency, ?incontinence,? testicular pain/swelling,?no ?family history of prostate cancer_  [facility]:? no? myalgia, ?weakness, edema,?+ fingers and knees ?arthralgia, or ?joint effusion  SKIN:? No?rash, hives,?itching or?sores Left forearm birth candace  NEURO:? No?headaches, numbness, tingling,? weakness, or ?dizziness  PSYCH:? No anxiety, ok depression, ?irritability, ?suicidal ideation or hallucinations  ENDO:? No ?polyuria, ?polydipsia, ?polyphagia  HEME:? No Bruising, lymphadenopathy, bleeding disorders ?or?signs of anemia  Physical Exam  Vitals & Measurements  T:?36.5? ?C (Oral)?  HR:?80(Peripheral)? BP:?138/90?  HT:?181?cm? WT:?147.6?kg? BMI:?45.05?  GENERAL: NAD, alert and oriented x 3  SKIN:? no rash or abnormal appearing skin lesions  HEENT:? PERRLA, EOMI, mouth wnl, throat wnl, EAC and TM wnl bilaterally  NECK:? FROM, no lymphadenopathy, no thyroid abnormalities palpable  CHEST:? CTA bilaterally no wheezes, crackles or rubs  CARDIAC:? RRR, no murmurs audible  ABDOMEN:? Soft, nontender, nondistended, NBSx4,?no rebound or guarding, no HSM  EXTREMITIES:? no clubbing, cyanosis, or edema.? joints wnl. +2 DP/PT pulse bilaterally  NEURO:? no sensory or motor deficits noted. CN II-XII intact. Gait wnl.?  GENITAL: normal testes, no hernia  RECTAL: no hemorrhoids or fissures, prostate WNL  Assessment/Plan  1.?Wellness examination?Z00.00  ?CBC, CMP, FLP, PSA, U/A, A1C, colonoscopy 2013 due 2016--unable to contact last year-decline referral this year, aware of risk---?Encourage pt to increase cardiovascular exercise and attempt to obtain at least 150 minutes of moderate aerobic exercise per week or 75 minutes of vigorous aerobic exercise weekly.  Ordered:  CBC w/ Auto Diff, Routine collect, 05/26/20 10:36:00 CDT, Blood, Order for future visit, Stop date 05/26/20 10:36:00 CDT, Lab Collect, Wellness examination, 05/26/20 10:36:00 CDT  Comprehensive Metabolic Panel, Routine collect, 05/26/20 10:36:00 CDT, Blood, Order for future visit, Stop date 05/26/20 10:36:00 CDT, Lab Collect, Wellness examination  Hypercholesterolemia  Hyperglycemia, 05/26/20 10:36:00 CDT  Hemoglobin A1c, Routine collect, 05/26/20 10:36:00 CDT, Blood, Order for future visit, Stop date 05/26/20 10:36:00 CDT, Lab Collect, Wellness examination  Hyperglycemia, 05/26/20 10:36:00 CDT  Lab Collection Request, 05/26/20 10:36:00 CDT, [facility] - [facility], 05/26/20 10:36:00 CDT, Wellness examination  Lipid Panel, Routine collect, 05/26/20 10:36:00 CDT, Blood, Order for future visit, Stop date 05/26/20 10:36:00 CDT, Lab Collect, Wellness  examination  Hypercholesterolemia, 05/26/20 10:36:00 CDT  Preventative Health Care Est 40-64 years [id number] PC, Wellness examination  Hypercholesterolemia  Hyperglycemia  Depression, [facility] - [facility], 05/26/20 10:36:00 CDT  Thyroid Stimulating Hormone, Routine collect, 05/26/20 10:36:00 CDT, Blood, Order for future visit, Stop date 05/26/20 10:36:00 CDT, Lab Collect, Wellness examination  Depression, 05/26/20 10:36:00 CDT  Urinalysis no Reflex, Routine collect, Urine, Order for future visit, 05/26/20 10:36:00 CDT, Stop date 05/26/20 10:36:00 CDT, Nurse collect, Wellness examination  Hypercholesterolemia  ?  2.?Prostate cancer screening?Z12.5  PSA  Ordered:  Prostate Specific Antigen, Routine collect, 05/26/20 10:36:00 CDT, Blood, Order for future visit, Stop date 05/26/20 10:36:00 CDT, Lab Collect, Prostate cancer screening, 05/26/20 10:36:00 CDT  ?  3.?Encounter for vaccination?Z23  ?got both ?Shingrix  ?  4.?Hypercholesterolemia?E78.00  ?continue crestor 10 mg?dispense 90 with 3 refills  Ordered:  Clinic Follow up, *Est. 11/26/20 3:00:00 CST, Order for future visit, Hyperglycemia  Hypercholesterolemia, [facility]  Comprehensive Metabolic Panel, Routine collect, 05/26/20 10:36:00 CDT, Blood, Order for future visit, Stop date 05/26/20 10:36:00 CDT, Lab Collect, Wellness examination  Hypercholesterolemia  Hyperglycemia, 05/26/20 10:36:00 CDT  Lipid Panel, Routine collect, 05/26/20 10:36:00 CDT, Blood, Order for future visit, Stop date 05/26/20 10:36:00 CDT, Lab Collect, Wellness examination  Hypercholesterolemia, 05/26/20 10:36:00 CDT  Preventative Health Care Est 40-64 years [id number] PC, Wellness examination  Hypercholesterolemia  Hyperglycemia  Depression, [facility] - [facility], 05/26/20 10:36:00 CDT  Urinalysis no Reflex, Routine collect, Urine, Order for future visit, 05/26/20 10:36:00 CDT, Stop date 05/26/20 10:36:00 CDT, Nurse collect, Wellness examination   Hypercholesterolemia  ?  5.?Hyperglycemia?R73.9  ?check A1C, last A1C 6.4--recheck 6 months  Ordered:  Clinic Follow up, *Est. 11/26/20 3:00:00 CST, Order for future visit, Hyperglycemia  Hypercholesterolemia, [facility]  Comprehensive Metabolic Panel, Routine collect, 05/26/20 10:36:00 CDT, Blood, Order for future visit, Stop date 05/26/20 10:36:00 CDT, Lab Collect, Wellness examination  Hypercholesterolemia  Hyperglycemia, 05/26/20 10:36:00 CDT  Hemoglobin A1c, Routine collect, 05/26/20 10:36:00 CDT, Blood, Order for future visit, Stop date 05/26/20 10:36:00 CDT, Lab Collect, Wellness examination  Hyperglycemia, 05/26/20 10:36:00 CDT  Preventative Health Care Est 40-64 years [id number] PC, Wellness examination  Hypercholesterolemia  Hyperglycemia  Depression, [facility] AM[facility] - [facility], 05/26/20 10:36:00 CDT  ?  6.?Depression?F32.9  ?continue wellbutrin  dispense 90 with 3 refills  Ordered:  Preventative Health Care Est 40-64 years [id number] PC, Wellness examination  Hypercholesterolemia  Hyperglycemia  Depression, [facility] - [facility], 05/26/20 10:36:00 CDT  Thyroid Stimulating Hormone, Routine collect, 05/26/20 10:36:00 CDT, Blood, Order for future visit, Stop date 05/26/20 10:36:00 CDT, Lab Collect, Wellness examination  Depression, 05/26/20 10:36:00 CDT  ?  Orders:  buPROPion, 150 mg = 1 tab(s), Oral, q24hr, # 90 tab(s), 3 Refill(s), Pharmacy: [facility] #[id number], 181, cm, Height/Length Dosing, 05/26/20 10:11:00 CDT, 147.6, kg, Weight Dosing, 05/26/20 10:11:00 CDT  rosuvastatin, 10 mg = 1 tab(s), Oral, Daily, # 90 tab(s), 3 Refill(s), Pharmacy: [facility] #[id number], 181, cm, Height/Length Dosing, 05/26/20 10:11:00 CDT, 147.6, kg, Weight Dosing, 05/26/20 10:11:00 CDT  Referrals  Clinic Follow up, *Est. 11/26/20 3:00:00 CST, Order for future visit, Hyperglycemia  Hypercholesterolemia, [facility]   Problem List/Past Medical History  Ongoing  Elevated BP without diagnosis of hypertension  Morbid  obesity  Wellness examination  Historical  Articular gout  Depression  Elevated PSA  H. pylori  kidney stones  Pancreatitis  pnuemonia  Procedure/Surgical History  Cholangiography and/or pancreatography; through existing catheter, radiological supervision and interpretation. (03/10/2014)  Injection procedure for cholangiography through an existing catheter (eg, percutaneous transhepatic or T-tube). (03/10/2014)  Other cholangiogram (03/10/2014)  Cholecystectomy (02/12/2014)  Cholecystectomy Laparoscopic (None) (02/12/2014)  Cholecystectomy; with cholangiography. (02/12/2014)  Intraoperative cholangiogram (02/12/2014)  Colonoscopy (09/01/2013)  Renal lithotripsy (2010)  cholecystec  Cholecystectomy; with cholangiography  prostate Biopsy  tib/fib fracture and surgery   Medications  Crestor 10 mg oral tablet, 10 mg= 1 tab(s), Oral, Daily, 3 refills  loratadine 10 mg oral capsule, 10 mg= 1 cap(s), Oral, Daily  Wellbutrin  mg/24 hours oral tablet, extended release, 150 mg= 1 tab(s), Oral, q24hr, 3 refills  Allergies  Seafood  Social History  Abuse/Neglect  No, 05/26/2020  No, 11/05/2019  Alcohol  Past, Beer, Stopped age 29 Years. 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  Congestive heart disease.: Father.  Diabetes mellitus: Father.  Immunizations  Vaccine Date Status Comments   zoster vaccine, inactivated 10/04/2019 Recorded    influenza virus vaccine, inactivated 10/02/2019 Recorded     tetanus/diphtheria/pertussis, acel(Tdap) 05/23/2019 Given    influenza virus vaccine, inactivated 11/18/2017 Recorded    influenza virus vaccine, inactivated 11/10/2016 Recorded    influenza virus vaccine, inactivated 11/13/2013 Recorded    influenza virus vaccine, inactivated 10/04/2011 Recorded 2019-05-22: UNKNOWN CAMPAIGNID   tetanus/diphth/pertuss (Tdap) adult/adol 10/21/2010 Recorded    influenza virus vaccine, inactivated 10/25/2007 Recorded    Health Maintenance  Health Maintenance  ???Pending?(in the next year)  ??? ??OverDue  ??? ? ? ?Diabetes Screening due??and every?  ??? ? ? ?ADL Screening due??02/14/15??and every 1??year(s)  ??? ? ? ?Alcohol Misuse Screening due??01/01/20??and every 1??year(s)  ??? ??Due?  ??? ? ? ?Depression Screening due??05/26/20??and every?  ??? ??Due In Future?  ??? ? ? ?Obesity Screening not due until??01/01/21??and every 1??year(s)  ???Satisfied?(in the past 1 year)  ??? ??Satisfied?  ??? ? ? ?Aspirin Therapy for CVD Prevention on??05/26/20.??Satisfied by Santos Gonzales MD  ??? ? ? ?Blood Pressure Screening on??05/26/20.??Satisfied by So Ch CMA  ??? ? ? ?Body Mass Index Check on??05/26/20.??Satisfied by So Ch CMA  ??? ? ? ?Diabetes Screening on??11/05/19.??Satisfied by Santi Ritter  ??? ? ? ?Influenza Vaccine on??10/02/19.??Satisfied by So Ch CMA  ??? ? ? ?Lipid Screening on??11/05/19.??Satisfied by Santi Ritter  ??? ? ? ?Obesity Screening on??05/26/20.??Satisfied by So Ch CMA  ?

## 2022-05-02 NOTE — HISTORICAL [facility]
This is a historical note converted from [employer]. Formatting and pictures may have been removed.  Please reference Jonah for original formatting and attached multimedia. Chief Complaint  3mth dm rc/fasting  History of Present Illness  Patient presents to office for 3-month follow-up diabetes.? He admits to starting metformin twice daily since last office visit due to an A1c of 6.8%. ?He admits to following a healthy diet and losing weight.? He denies checking his CBGs at home,?but will be getting?a CBG machine?to check his?glucose. ?He also denies?recent eye exam. ?He has no complaints at this time. ?He denies chest pain or shortness of breath.  Review of Systems  General: No weight gain  HEENT: No vision changes, DM eye exam: never  Cardiac: No CP, no SOB  GI: No diarrhea, no N/V  Endocrine: No polyuria, no polydipsia, no polyphagia  Extremities: No signs/symptoms of neuropathy  Physical Exam  Vitals & Measurements  T:?36.4? ?C (Oral)? HR:?80(Peripheral)? BP:?116/70?  HT:?181.00?cm? WT:?140.000?kg? BMI:?42.73?  General: Awake, alert, oriented x4  HEENT: PERRLA, mouth and throat clear, moist mucus membranes  Chest: CTA bilaterally  Cardiac: RRR, no murmurs, rubs or gallops  Extremities: No edema, +2 pulses DP/PT  Feet: 10G filament x5 bilaterally, no ulcers or sores  Assessment/Plan  1.?Diabetes mellitus?E11.9  Stable/well controlled at this time. Continue current treatment, daily CBGs with log, low carb diet/ADA diet recommendations discussed.? Will continue to monitor and FU.?  Eye exam--never, encouraged patient to complete ASAP and to send report to us.  Foot exam today.  Microalbumin/CMP/A1C today.  Lost 18#, continue weight loss.  Ordered:  Clinic Follow up, *Est. 02/06/21 3:00:00 CST, Order for future visit, Diabetes mellitus, [facility]  Comprehensive Metabolic Panel, Routine collect, 08/06/20 9:17:00 CDT, Blood, Order for future visit, Stop date 08/06/20 9:17:00 CDT, Lab Collect, Diabetes mellitus, 08/06/20  9:17:00 CDT  Hemoglobin A1c, Routine collect, 08/06/20 9:17:00 CDT, Blood, Order for future visit, Stop date 08/06/20 9:17:00 CDT, Lab Collect, Diabetes mellitus, 08/06/20 9:17:00 CDT  Lab Collection Request, 08/06/20 9:17:00 CDT, [facility] AM[facility] - AFP, 08/06/20 9:17:00 CDT, Diabetes mellitus  Microalbumin Urine, Routine collect, Urine, Order for future visit, 08/06/20 9:17:00 CDT, Stop date 08/06/20 9:17:00 CDT, Nurse collect, Diabetes mellitus  Office/Outpatient Visit Level 3 Established [id number] PC, Diabetes mellitus, [facility] - AFP, 08/06/20 9:17:00 CDT  ?  Orders:  metFORMIN, 500 mg = 1 tab(s), Oral, BID, # 60 tab(s), 6 Refill(s), Pharmacy: [id number] #[id number], 181, cm, Height/Length Dosing, 08/06/20 9:00:00 CDT, 140, kg, Weight Dosing, 08/06/20 9:00:00 CDT  Referrals  Clinic Follow up, *Est. 02/06/21 3:00:00 CST, Order for future visit, Diabetes mellitus, [facility]   Problem List/Past Medical History  Ongoing  Elevated BP without diagnosis of hypertension  Morbid obesity  Wellness examination  Historical  Articular gout  Depression  Elevated PSA  H. pylori  kidney stones  Pancreatitis  pnuemonia  Procedure/Surgical History  Cholangiography and/or pancreatography; through existing catheter, radiological supervision and interpretation. (03/10/2014)  Injection procedure for cholangiography through an existing catheter (eg, percutaneous transhepatic or T-tube). (03/10/2014)  Other cholangiogram (03/10/2014)  Cholecystectomy (02/12/2014)  Cholecystectomy Laparoscopic (None) (02/12/2014)  Cholecystectomy; with cholangiography. (02/12/2014)  Intraoperative cholangiogram (02/12/2014)  Colonoscopy (09/01/2013)  Renal lithotripsy (2010)  cholecystec  Cholecystectomy; with cholangiography  prostate Biopsy  tib/fib fracture and surgery   Medications  Crestor 10 mg oral tablet, 10 mg= 1 tab(s), Oral, Daily, 3 refills  loratadine 10 mg oral capsule, 10 mg= 1 cap(s), Oral, Daily  metformin 500 mg oral tablet, 500 mg= 1  tab(s), Oral, BID, 6 refills  Wellbutrin  mg/24 hours oral tablet, extended release, 150 mg= 1 tab(s), Oral, q24hr, 3 refills  Allergies  Seafood  Social History  Abuse/Neglect  No, 08/06/2020  No, 05/26/2020  No, 11/05/2019  Alcohol  Past, Beer, Stopped age 29 Years. 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  Congestive heart disease.: Father.  Diabetes mellitus: Father.  Immunizations  Vaccine Date Status Comments   zoster vaccine, inactivated 10/04/2019 Recorded    influenza virus vaccine, inactivated 10/02/2019 Recorded    tetanus/diphtheria/pertussis, acel(Tdap) 05/23/2019 Given    influenza virus vaccine, inactivated 11/18/2017 Recorded    influenza virus vaccine, inactivated 11/10/2016 Recorded    influenza virus vaccine, inactivated 11/13/2013 Recorded    influenza virus vaccine, inactivated 10/04/2011 Recorded 2019-05-22: UNKNOWN CAMPAIGNID   tetanus/diphth/pertuss (Tdap) adult/adol 10/21/2010 Recorded    influenza virus vaccine, inactivated 10/25/2007 Recorded    Health Maintenance  Health Maintenance  ???Pending?(in the next year)  ??? ??OverDue  ??? ? ? ?Influenza Vaccine due??and every?  ??? ? ? ?ADL Screening due??02/14/15??and every 1??year(s)  ??? ? ? ?Alcohol Misuse Screening due??01/02/20??and every 1??year(s)  ??? ??Due?  ??? ? ? ?Zoster Vaccine due??08/06/20??and every?  ??? ??Due In Future?  ??? ? ? ?Obesity Screening not  due until??01/01/21??and every 1??year(s)  ??? ? ? ?Aspirin Therapy for CVD Prevention not due until??05/26/21??and every 1??year(s)  ???Satisfied?(in the past 1 year)  ??? ??Satisfied?  ??? ? ? ?Aspirin Therapy for CVD Prevention on??05/26/20.??Satisfied by Santos Gonzales MD  ??? ? ? ?Blood Pressure Screening on??08/06/20.??Satisfied by So Ch CMA.  ??? ? ? ?Body Mass Index Check on??08/06/20.??Satisfied by So Ch CMA  ??? ? ? ?Diabetes Maintenance-Foot Exam on??08/06/20.??Satisfied by Lauren Collado NP  ??? ? ? ?Diabetes Screening on??05/26/20.??Satisfied by Steph Ch  ??? ? ? ?Influenza Vaccine on??10/02/19.??Satisfied by So Ch CMA  ??? ? ? ?Lipid Screening on??05/26/20.??Satisfied by Steph Ch  ??? ? ? ?Obesity Screening on??08/06/20.??Satisfied by So Ch CMA  ?      agree with current workup, tx and followup.

## 2022-05-02 NOTE — HISTORICAL [facility]ER
This is a historical note converted from Jonah. Formatting and pictures may have been removed.  Please reference Jonah for original formatting and attached multimedia. Chief Complaint  cpx/fasting. ?Discuss d/c Wellbutrin.  History of Present Illness  Patient is here today for wellness CPX and 6-month recheck diabetes.? He is tolerating all medications but does report missing?his p.m. dose of Metformin frequently.? His diabetic eye exam is overdue and he will try to get this scheduled.? His last colonoscopy was in 2013 and at that time?Dr. Starks recommended repeating it in 3 years.? He would like to be referred to a?different gastroenterologist?to have this completed.? He has gained?16 pounds?since August?but will try to work on?losing weight.? He did get both Covid vaccines.? He feels that his depression is stable and would like to?discontinue Wellbutrin.  Review of Systems  GENERAL:?no? unexplained wt ?loss?16 lbs ,no ?fever, fatigue, chills, night sweats or ?weakness  HEENT: + tickle ? sore throat x 2 weeks--+PND , no ?ear pain, ?sinus pressure, ?+ nasal congestion, or ?rhinorrhea  VISION:?no ?vision changes, ?glaucoma, cataracts--DM eye exam overdue--pt will set up  CARDIAC: no? chest pain,??palpitations,?Dyspnea on exertion, ?orthopnea  RESPIRATORY:?no??cough,?wheezing, sputum production or?SOB--got covid vaccine  GI: no???abdominal pain, n&v,?constipation, diarrhea,??blood in stool or_?no family history of colon cancer?_  :?no? dysuria, ?hematuria, ?frequency, urgency, ?incontinence,? testicular pain/swelling,?_?no family history of prostate cancer_  MUSC/SKEL:? no? myalgia, ?weakness, edema,? + knees and fingers arthralgia, or ?joint effusion  SKIN:? No?rash, hives,?itching or?sores  NEURO:? No?headaches, numbness, tingling,? weakness, or ?dizziness  PSYCH:? No anxiety, depression, ?irritability, ?suicidal ideation or hallucinations  ENDO:? No ?polyuria, ?polydipsia, ?polyphagia  HEME:? No Bruising,  lymphadenopathy, bleeding disorders ?or?signs of anemia  Physical Exam  Vitals & Measurements  HR:?82(Peripheral)? BP:?146/86?  HT:?181.00?cm? WT:?147.000?kg? BMI:?44.87?  GENERAL: NAD, alert and oriented x 3  SKIN:? no rash or abnormal appearing skin lesions  HEENT:? PERRLA, EOMI, mouth wnl, throat wnl, EAC and TM wnl bilaterally  NECK:? FROM, no lymphadenopathy, no thyroid abnormalities palpable  CHEST:? CTA bilaterally no wheezes, crackles or rubs  CARDIAC:? RRR, no murmurs audible  ABDOMEN:? Soft, nontender, nondistended, NBSx4,?no rebound or guarding, no HSM  EXTREMITIES:? no clubbing, cyanosis, or edema.? joints wnl. +2 DP/PT pulse bilaterally  NEURO:? no sensory or motor deficits noted. CN II-XII intact. Gait wnl.?  GENITAL: normal testes, no hernia  RECTAL: no hemorrhoids or fissures, prostate WNL  Assessment/Plan  1.?Wellness examination?Z00.00  ?CBC, CMP, FLP, PSA, U/A, A1C, colonoscopy 9/2013 3 polyps due 2016 ??set up ?Dr Starks--pt request [facility] gastro , Encourage pt to increase cardiovascular exercise and attempt to obtain at least 150 minutes of moderate aerobic exercise per week or 75 minutes of vigorous aerobic exercise weekly.  Ordered:  CBC w/ Auto Diff, Routine collect, 06/01/21 8:57:00 CDT, Blood, Order for future visit, Stop date 06/01/21 8:57:00 CDT, Lab Collect, Wellness examination, 06/01/21 8:57:00 CDT  Comprehensive Metabolic Panel, Routine collect, 06/01/21 8:57:00 CDT, Blood, Order for future visit, Stop date 06/01/21 8:57:00 CDT, Lab Collect, Wellness examination  Elevated BP without diagnosis of hypertension  Diabetes mellitus, 06/01/21 8:57:00 CDT  External Referral, colon overdue--last 2013 Dr Starks, [facility]--would like to change here, 06/01/21 8:58:00 CDT, Wellness examination  Hemoglobin A1c, Routine collect, 06/01/21 8:57:00 CDT, Blood, Order for future visit, Stop date 06/01/21 8:57:00 CDT, Lab Collect, Wellness examination  Diabetes mellitus, 06/01/21 8:57:00  CDT  Lab Collection Request, 06/01/21 8:57:00 CDT, [facility] - AFP, 06/01/21 8:57:00 CDT, Wellness examination  Lipid Panel, Routine collect, 06/01/21 8:57:00 CDT, Blood, Order for future visit, Stop date 06/01/21 8:57:00 CDT, Lab Collect, Wellness examination  Hypercholesterolemia, 06/01/21 8:57:00 CDT  Preventative Health Care Est 40-64 years [id number] PC, Wellness examination  Diabetes mellitus  Hypercholesterolemia  Elevated BP without diagnosis of hypertension, [facility] - AFP, 06/01/21 8:58:00 CDT  Thyroid Stimulating Hormone, Routine collect, 06/01/21 8:57:00 CDT, Blood, Order for future visit, Stop date 06/01/21 8:57:00 CDT, Lab Collect, Wellness examination  Diabetes mellitus  Hypercholesterolemia  Elevated BP without diagnosis of hypertension, 06/01/21 8:57:00 CDT  Urinalysis no Reflex, Routine collect, Urine, Order for future visit, 06/01/21 8:57:00 CDT, Stop date 06/01/21 8:57:00 CDT, Nurse collect, Wellness examination  Elevated BP without diagnosis of hypertension  ?  2.?Prostate cancer screening?Z12.5  ?PSA  Ordered:  Prostate Specific Antigen, Routine collect, 06/01/21 8:57:00 CDT, Blood, Order for future visit, Stop date 06/01/21 8:57:00 CDT, Lab Collect, Prostate cancer screening, 06/01/21 8:57:00 CDT  ?  3.?Encounter for immunization?Z23  ?prevnar  Ordered:  pneumococcal 13-valent conjugate vaccine, 0.5 mL, form: Injection, IM, Once, first dose 06/01/21 8:58:00 CDT, stop date 06/01/21 8:58:00 CDT  ?  4.?Diabetes mellitus?E11.9  ?Last A1C - 6.4, check microalbumin, DM eye exam --need report , DM foot exam 8/2020  Ordered:  Clinic Follow up, *Est. 12/01/21 3:00:00 CST, Order for future visit, Diabetes mellitus  Hypercholesterolemia  Elevated BP without diagnosis of hypertension, [facility] AFP  Comprehensive Metabolic Panel, Routine collect, 06/01/21 8:57:00 CDT, Blood, Order for future visit, Stop date 06/01/21 8:57:00 CDT, Lab Collect, Wellness examination  Elevated BP without diagnosis of  hypertension  Diabetes mellitus, 06/01/21 8:57:00 CDT  Hemoglobin A1c, Routine collect, 06/01/21 8:57:00 CDT, Blood, Order for future visit, Stop date 06/01/21 8:57:00 CDT, Lab Collect, Wellness examination  Diabetes mellitus, 06/01/21 8:57:00 CDT  Preventative Health Care Est 40-64 years [id number] PC, Wellness examination  Diabetes mellitus  Hypercholesterolemia  Elevated BP without diagnosis of hypertension, [facility] - AFP, 06/01/21 8:58:00 CDT  Thyroid Stimulating Hormone, Routine collect, 06/01/21 8:57:00 CDT, Blood, Order for future visit, Stop date 06/01/21 8:57:00 CDT, Lab Collect, Wellness examination  Diabetes mellitus  Hypercholesterolemia  Elevated BP without diagnosis of hypertension, 06/01/21 8:57:00 CDT  ?  5.?Hypercholesterolemia?E78.00  ?continue crestor 10 mg  Ordered:  Clinic Follow up, *Est. 12/01/21 3:00:00 CST, Order for future visit, Diabetes mellitus  Hypercholesterolemia  Elevated BP without diagnosis of hypertension, [facility] AFP  Lipid Panel, Routine collect, 06/01/21 8:57:00 CDT, Blood, Order for future visit, Stop date 06/01/21 8:57:00 CDT, Lab Collect, Wellness examination  Hypercholesterolemia, 06/01/21 8:57:00 CDT  [facility] Care Est 40-64 years [id number] PC, Wellness examination  Diabetes mellitus  Hypercholesterolemia  Elevated BP without diagnosis of hypertension, [facility] - AFP, 06/01/21 8:58:00 CDT  Thyroid Stimulating Hormone, Routine collect, 06/01/21 8:57:00 CDT, Blood, Order for future visit, Stop date 06/01/21 8:57:00 CDT, Lab Collect, Wellness examination  Diabetes mellitus  Hypercholesterolemia  Elevated BP without diagnosis of hypertension, 06/01/21 8:57:00 CDT  ?  6.?Elevated BP without diagnosis of hypertension?R03.0  Begin Lisinopril 10 mg ---keep BP record and fax for review in couple weeks  Ordered:  Clinic Follow up, *Est. 12/01/21 3:00:00 CST, Order for future visit, Diabetes mellitus  Hypercholesterolemia  Elevated BP without diagnosis of  hypertension, [facility] AFP  Comprehensive Metabolic Panel, Routine collect, 06/01/21 8:57:00 CDT, Blood, Order for future visit, Stop date 06/01/21 8:57:00 CDT, Lab Collect, Wellness examination  Elevated BP without diagnosis of hypertension  Diabetes mellitus, 06/01/21 8:57:00 CDT  Preventative Health Care Est 40-64 years [id number] PC, Wellness examination  Diabetes mellitus  Hypercholesterolemia  Elevated BP without diagnosis of hypertension, [facility] - AFP, 06/01/21 8:58:00 CDT  Thyroid Stimulating Hormone, Routine collect, 06/01/21 8:57:00 CDT, Blood, Order for future visit, Stop date 06/01/21 8:57:00 CDT, Lab Collect, Wellness examination  Diabetes mellitus  Hypercholesterolemia  Elevated BP without diagnosis of hypertension, 06/01/21 8:57:00 CDT  Urinalysis no Reflex, Routine collect, Urine, Order for future visit, 06/01/21 8:57:00 CDT, Stop date 06/01/21 8:57:00 CDT, Nurse collect, Wellness examination  Elevated BP without diagnosis of hypertension  ?  Orders:  lisinopril, 10 mg = 1 tab(s), Oral, Daily, # 30 tab(s), 11 Refill(s), Pharmacy: [facility] #[id number], 181, cm, Height/Length Dosing, 06/01/21 8:40:00 CDT, 147, kg, Weight Dosing, 06/01/21 8:40:00 CDT  rosuvastatin, See Instructions, TAKE 1 TABLET BY MOUTH DAILY, # 30 tab(s), 9 Refill(s), Pharmacy: [facility] #[id number], 181, cm, Height/Length Dosing, 06/01/21 8:40:00 CDT, 147, kg, Weight Dosing, 06/01/21 8:40:00 CDT  ?Okay to DC Wellbutrin,?call to restart if depression returns.  Referrals  External Referral, colon overdue--last 2013 Dr Starks, [facility]--would like to change here, 06/01/21 8:58:00 CDT, Wellness examination  Clinic Follow up, *Est. 12/01/21 3:00:00 CST, Order for future visit, Diabetes mellitus  Hypercholesterolemia  Elevated BP without diagnosis of hypertension, [facility] AFP   Problem List/Past Medical History  Ongoing  Diabetes mellitus  Elevated BP without diagnosis of hypertension  Hypercholesterolemia  Morbid  obesity  Wellness examination  Historical  Articular gout  Depression  Elevated PSA  H. pylori  kidney stones  Pancreatitis  pnuemonia  Procedure/Surgical History  Cholangiography and/or pancreatography; through existing catheter, radiological supervision and interpretation. (03/10/2014)  Injection procedure for cholangiography through an existing catheter (eg, percutaneous transhepatic or T-tube). (03/10/2014)  Other cholangiogram (03/10/2014)  Cholecystectomy (02/12/2014)  Cholecystectomy Laparoscopic (None) (02/12/2014)  Cholecystectomy; with cholangiography. (02/12/2014)  Intraoperative cholangiogram (02/12/2014)  Colonoscopy (09/01/2013)  Renal lithotripsy (2010)  cholecystec  Cholecystectomy; with cholangiography  prostate Biopsy  tib/fib fracture and surgery   Medications  lisinopril 10 mg oral tablet, 10 mg= 1 tab(s), Oral, Daily, 11 refills  loratadine 10 mg oral capsule, 10 mg= 1 cap(s), Oral, Daily  metformin 500 mg oral tablet, See Instructions  Prevnar 13, 0.5 mL, IM, Once  rosuvastatin 10 mg oral tablet, See Instructions, 9 refills  Allergies  Seafood  Social History  Abuse/Neglect  No, 06/01/2021  No, 02/18/2021  No, 08/06/2020  No, 05/26/2020  No, 11/05/2019  Alcohol  Past, Beer, Stopped age 29 Years. 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  Home/Environment  Lives with Alone., 05/22/2019  Lives with ., 05/22/2019  Substance Use - Denies Substance Abuse, 02/12/2014  Tobacco - Denies Tobacco Use, 02/12/2014  Never (less than 100 in lifetime), No, 06/01/2021  Never (less than 100 in lifetime), No, 06/01/2021  Never (less than 100 in lifetime), N/A, 02/18/2021  Never (less than 100 in lifetime), N/A, 08/06/2020  Never (less than 100 in lifetime), No, 05/26/2020  Never (less than 100 in lifetime), N/A, 11/05/2019  Never (less than 100 in lifetime), N/A, 05/23/2019  Never (less than 100 in lifetime), N/A,  05/22/2019  Family History  Acute myocardial infarction.: Father.  Congestive heart disease.: Father.  Diabetes mellitus: Father.  Immunizations  Vaccine Date Status Comments   COVID-19 mRNA, LNP-S, PF - Moderna 04/16/2021 Recorded    COVID-19 mRNA, LNP-S, PF - Moderna 03/17/2021 Recorded    influenza virus vaccine, inactivated 09/30/2020 Recorded    zoster vaccine, inactivated 12/13/2019 Recorded    zoster vaccine, inactivated 10/04/2019 Recorded    zoster vaccine, inactivated 10/02/2019 Recorded    influenza virus vaccine, inactivated 10/02/2019 Recorded    tetanus/diphtheria/pertussis, acel(Tdap) 05/23/2019 Given    influenza virus vaccine, inactivated 11/18/2017 Recorded    influenza virus vaccine, inactivated 11/10/2016 Recorded    influenza virus vaccine, inactivated 11/13/2013 Recorded    influenza virus vaccine, inactivated 10/04/2011 Recorded 2019-05-22: UNKNOWN CAMPAIGNID   tetanus/diphth/pertuss (Tdap) adult/adol 10/21/2010 Recorded    influenza virus vaccine, inactivated 10/25/2007 Recorded    Health Maintenance  Health Maintenance  ???Pending?(in the next year)  ??? ??OverDue  ??? ? ? ?ADL Screening due??02/14/15??and every 1??year(s)  ??? ? ? ?Influenza Vaccine due??10/01/20??and every 1??day(s)  ??? ? ? ?Alcohol Misuse Screening due??01/02/21??and every 1??year(s)  ??? ??Due?  ??? ? ? ?Aspirin Therapy for CVD Prevention due??05/26/21??and every 1??year(s)  ??? ? ? ?Depression Screening due??06/01/21??Unknown Frequency  ??? ? ? ?Diabetes Maintenance-Eye Exam due??06/01/21??Unknown Frequency  ??? ??Due In Future?  ??? ? ? ?Diabetes Maintenance-Foot Exam not due until??08/06/21??and every 1??year(s)  ??? ? ? ?Obesity Screening not due until??01/01/22??and every 1??year(s)  ??? ? ? ?Diabetes Maintenance-HgbA1c not due until??02/18/22??and every 1??year(s)  ??? ? ? ?Diabetes Maintenance-Fasting Lipid Profile not due until??02/18/22??and every 1??year(s)  ??? ? ? ?Diabetes Maintenance-Serum Creatinine not  due until??02/18/22??and every 1??year(s)  ???Satisfied?(in the past 1 year)  ??? ??Satisfied?  ??? ? ? ?Blood Pressure Screening on??06/01/21.??Satisfied by So Ch CMA  ??? ? ? ?Body Mass Index Check on??06/01/21.??Satisfied by So Ch CMA  ??? ? ? ?Diabetes Maintenance-Fasting Lipid Profile on??02/18/21.??Satisfied by Steph Ch  ??? ? ? ?Diabetes Maintenance-Foot Exam on??08/06/20.??Satisfied by Lauren Collado NP  ??? ? ? ?Diabetes Maintenance-HgbA1c on??02/18/21.??Satisfied by Santi Ritter  ??? ? ? ?Diabetes Maintenance-Microalbumin on??08/06/20.??Satisfied by Santi Ritter  ??? ? ? ?Diabetes Maintenance-Serum Creatinine on??02/18/21.??Satisfied by Steph Ch  ??? ? ? ?Diabetes Screening on??02/18/21.??Satisfied by Steph Ch  ??? ? ? ?Influenza Vaccine on??09/30/20.??Satisfied by So Ch CMA  ??? ? ? ?Lipid Screening on??02/18/21.??Satisfied by Steph Ch  ??? ? ? ?Obesity Screening on??06/01/21.??Satisfied by So Ch CMA  ?

## 2022-06-01 PROBLEM — E78.00 HYPERCHOLESTEROLEMIA: Status: ACTIVE | Noted: 2022-06-01

## 2022-06-01 PROBLEM — E66.01 MORBID OBESITY: Status: ACTIVE | Noted: 2022-06-01

## 2022-06-01 PROBLEM — I10 PRIMARY HYPERTENSION: Status: ACTIVE | Noted: 2022-06-01

## 2022-06-01 PROBLEM — Z23 IMMUNIZATION DUE: Status: ACTIVE | Noted: 2022-06-01

## 2022-06-01 PROBLEM — E11.9 DIABETES MELLITUS: Status: ACTIVE | Noted: 2022-06-01

## 2022-06-01 PROBLEM — Z00.00 ENCOUNTER FOR WELLNESS EXAMINATION IN ADULT: Status: ACTIVE | Noted: 2022-06-01

## 2022-06-01 PROBLEM — Z12.5 PROSTATE CANCER SCREENING: Status: ACTIVE | Noted: 2022-06-01

## 2022-09-05 PROBLEM — Z00.00 ENCOUNTER FOR WELLNESS EXAMINATION IN ADULT: Status: RESOLVED | Noted: 2022-06-01 | Resolved: 2022-09-05

## 2023-06-05 PROBLEM — Z00.00 ENCOUNTER FOR WELLNESS EXAMINATION IN ADULT: Status: ACTIVE | Noted: 2023-06-05

## 2023-06-06 PROCEDURE — 86803 HEPATITIS C AB TEST: CPT | Performed by: FAMILY MEDICINE

## 2023-09-04 PROBLEM — Z00.00 ENCOUNTER FOR WELLNESS EXAMINATION IN ADULT: Status: RESOLVED | Noted: 2023-06-05 | Resolved: 2023-09-04

## 2024-05-19 ENCOUNTER — TELEPHONE (OUTPATIENT)
Dept: PHARMACY | Facility: CLINIC | Age: 59
End: 2024-05-19

## 2024-09-25 ENCOUNTER — TELEPHONE (OUTPATIENT)
Dept: PHARMACY | Facility: CLINIC | Age: 59
End: 2024-09-25

## 2024-09-25 NOTE — TELEPHONE ENCOUNTER
[facility]/Population Health Chart Review & Patient Outreach Details For Medication Adherence Project    Reason for Outreach Encounter: 3rd Party payor non-compliance report ([employer], [employer], [facility], etc)  2.  Patient Outreach Method: "[employer]hart message  3.   Medication in question: metformin & losartan   LAST FILLED: 3/10/24 for 90 day supply for both   Diabetes Medications               metFORMIN (GLUCOPHAGE) 500 MG tablet Take 1 tablet (500 mg total) by mouth 2 (two) times daily with meals.              Hypertension Medications               losartan (COZAAR) 50 MG tablet Take 1 tablet (50 mg total) by mouth once daily.              4.  Reviewed and or Updates Made To: Patient Chart  5. Outreach Outcomes and/or actions taken: Sent inquiry to patient: Waiting for response.

## 2024-10-29 ENCOUNTER — TELEPHONE (OUTPATIENT)
Dept: PHARMACY | Facility: CLINIC | Age: 59
End: 2024-10-29

## 2024-11-23 ENCOUNTER — TELEPHONE (OUTPATIENT)
Dept: PHARMACY | Facility: CLINIC | Age: 59
End: 2024-11-23

## 2024-11-23 NOTE — TELEPHONE ENCOUNTER
[facility]/Population Health Chart Review & Patient Outreach Details For Medication Adherence Project    Reason for Outreach Encounter: 3rd Party payor non-compliance report ([employer], [employer], C, etc)  2.  Patient Outreach Method: [employer] message  3.   Medication in question:   Diabetes Medications               metFORMIN (GLUCOPHAGE) 500 MG tablet Take 1 tablet (500 mg total) by mouth 2 (two) times daily with meals.                 metformin  last filled  3/10/24 for 90 day supply      4.  Reviewed and or Updates Made To: Patient Chart  5. Outreach Outcomes and/or actions taken: Sent inquiry to patient: Waiting for response  Additional Notes: